# Patient Record
Sex: MALE | Race: WHITE | NOT HISPANIC OR LATINO | URBAN - METROPOLITAN AREA
[De-identification: names, ages, dates, MRNs, and addresses within clinical notes are randomized per-mention and may not be internally consistent; named-entity substitution may affect disease eponyms.]

---

## 2023-04-28 LAB
ALANINE AMINOTRANSFERASE (SGPT) (U/L) IN SER/PLAS: 19 U/L (ref 10–52)
ALBUMIN (G/DL) IN SER/PLAS: 4.3 G/DL (ref 3.4–5)
ALKALINE PHOSPHATASE (U/L) IN SER/PLAS: 66 U/L (ref 33–136)
ANION GAP IN SER/PLAS: 11 MMOL/L (ref 10–20)
APPEARANCE, URINE: CLEAR
ASPARTATE AMINOTRANSFERASE (SGOT) (U/L) IN SER/PLAS: 21 U/L (ref 9–39)
BASOPHILS (10*3/UL) IN BLOOD BY AUTOMATED COUNT: 0.02 X10E9/L (ref 0–0.1)
BASOPHILS/100 LEUKOCYTES IN BLOOD BY AUTOMATED COUNT: 0.6 % (ref 0–2)
BILIRUBIN TOTAL (MG/DL) IN SER/PLAS: 1.8 MG/DL (ref 0–1.2)
BILIRUBIN, URINE: NEGATIVE
BLOOD, URINE: NEGATIVE
CALCIUM (MG/DL) IN SER/PLAS: 9.1 MG/DL (ref 8.6–10.6)
CARBON DIOXIDE, TOTAL (MMOL/L) IN SER/PLAS: 28 MMOL/L (ref 21–32)
CHLORIDE (MMOL/L) IN SER/PLAS: 102 MMOL/L (ref 98–107)
CHOLESTEROL (MG/DL) IN SER/PLAS: 171 MG/DL (ref 0–199)
CHOLESTEROL IN HDL (MG/DL) IN SER/PLAS: 54.3 MG/DL
CHOLESTEROL/HDL RATIO: 3.1
COLOR, URINE: YELLOW
CREATININE (MG/DL) IN SER/PLAS: 1.09 MG/DL (ref 0.5–1.3)
EOSINOPHILS (10*3/UL) IN BLOOD BY AUTOMATED COUNT: 0.07 X10E9/L (ref 0–0.4)
EOSINOPHILS/100 LEUKOCYTES IN BLOOD BY AUTOMATED COUNT: 2 % (ref 0–6)
ERYTHROCYTE DISTRIBUTION WIDTH (RATIO) BY AUTOMATED COUNT: 11.7 % (ref 11.5–14.5)
ERYTHROCYTE MEAN CORPUSCULAR HEMOGLOBIN CONCENTRATION (G/DL) BY AUTOMATED: 33.7 G/DL (ref 32–36)
ERYTHROCYTE MEAN CORPUSCULAR VOLUME (FL) BY AUTOMATED COUNT: 96 FL (ref 80–100)
ERYTHROCYTES (10*6/UL) IN BLOOD BY AUTOMATED COUNT: 4.68 X10E12/L (ref 4.5–5.9)
GFR MALE: 71 ML/MIN/1.73M2
GLUCOSE (MG/DL) IN SER/PLAS: 83 MG/DL (ref 74–99)
GLUCOSE, URINE: NEGATIVE MG/DL
HEMATOCRIT (%) IN BLOOD BY AUTOMATED COUNT: 45.1 % (ref 41–52)
HEMOGLOBIN (G/DL) IN BLOOD: 15.2 G/DL (ref 13.5–17.5)
IMMATURE GRANULOCYTES/100 LEUKOCYTES IN BLOOD BY AUTOMATED COUNT: 0.3 % (ref 0–0.9)
KETONES, URINE: NEGATIVE MG/DL
LDL: 100 MG/DL (ref 0–99)
LEUKOCYTE ESTERASE, URINE: NEGATIVE
LEUKOCYTES (10*3/UL) IN BLOOD BY AUTOMATED COUNT: 3.6 X10E9/L (ref 4.4–11.3)
LYMPHOCYTES (10*3/UL) IN BLOOD BY AUTOMATED COUNT: 1.09 X10E9/L (ref 0.8–3)
LYMPHOCYTES/100 LEUKOCYTES IN BLOOD BY AUTOMATED COUNT: 30.6 % (ref 13–44)
MONOCYTES (10*3/UL) IN BLOOD BY AUTOMATED COUNT: 0.46 X10E9/L (ref 0.05–0.8)
MONOCYTES/100 LEUKOCYTES IN BLOOD BY AUTOMATED COUNT: 12.9 % (ref 2–10)
NEUTROPHILS (10*3/UL) IN BLOOD BY AUTOMATED COUNT: 1.91 X10E9/L (ref 1.6–5.5)
NEUTROPHILS/100 LEUKOCYTES IN BLOOD BY AUTOMATED COUNT: 53.6 % (ref 40–80)
NITRITE, URINE: NEGATIVE
NRBC (PER 100 WBCS) BY AUTOMATED COUNT: 0 /100 WBC (ref 0–0)
PH, URINE: 8 (ref 5–8)
PLATELETS (10*3/UL) IN BLOOD AUTOMATED COUNT: 230 X10E9/L (ref 150–450)
POTASSIUM (MMOL/L) IN SER/PLAS: 4 MMOL/L (ref 3.5–5.3)
PROSTATE SPECIFIC ANTIGEN,SCREEN: 0.76 NG/ML (ref 0–4)
PROTEIN TOTAL: 7.5 G/DL (ref 6.4–8.2)
PROTEIN, URINE: NEGATIVE MG/DL
SODIUM (MMOL/L) IN SER/PLAS: 137 MMOL/L (ref 136–145)
SPECIFIC GRAVITY, URINE: 1.01 (ref 1–1.03)
THYROTROPIN (MIU/L) IN SER/PLAS BY DETECTION LIMIT <= 0.05 MIU/L: 5.07 MIU/L (ref 0.44–3.98)
TRIGLYCERIDE (MG/DL) IN SER/PLAS: 83 MG/DL (ref 0–149)
URATE (MG/DL) IN SER/PLAS: 6.5 MG/DL (ref 4–7.5)
UREA NITROGEN (MG/DL) IN SER/PLAS: 16 MG/DL (ref 6–23)
UROBILINOGEN, URINE: <2 MG/DL (ref 0–1.9)
VLDL: 17 MG/DL (ref 0–40)

## 2023-05-02 LAB — ACETYLCHOL MODUL AB: 0 %

## 2023-10-13 ENCOUNTER — OFFICE VISIT (OUTPATIENT)
Dept: PRIMARY CARE | Facility: CLINIC | Age: 73
End: 2023-10-13
Payer: MEDICARE

## 2023-10-13 ENCOUNTER — LAB (OUTPATIENT)
Dept: LAB | Facility: LAB | Age: 73
End: 2023-10-13
Payer: MEDICARE

## 2023-10-13 VITALS
HEART RATE: 74 BPM | RESPIRATION RATE: 16 BRPM | SYSTOLIC BLOOD PRESSURE: 100 MMHG | OXYGEN SATURATION: 96 % | DIASTOLIC BLOOD PRESSURE: 70 MMHG | TEMPERATURE: 97.8 F | BODY MASS INDEX: 22.5 KG/M2 | WEIGHT: 180 LBS

## 2023-10-13 DIAGNOSIS — J06.9 VIRAL UPPER RESPIRATORY TRACT INFECTION: ICD-10-CM

## 2023-10-13 DIAGNOSIS — R53.83 OTHER FATIGUE: ICD-10-CM

## 2023-10-13 DIAGNOSIS — I48.92 ATRIAL FLUTTER, UNSPECIFIED TYPE (MULTI): ICD-10-CM

## 2023-10-13 DIAGNOSIS — R00.0 INCREASED HEART RATE: ICD-10-CM

## 2023-10-13 DIAGNOSIS — R09.89 SYMPTOMS OF UPPER RESPIRATORY INFECTION (URI): ICD-10-CM

## 2023-10-13 DIAGNOSIS — I48.92 ATRIAL FLUTTER, UNSPECIFIED TYPE (MULTI): Primary | ICD-10-CM

## 2023-10-13 PROBLEM — M54.2 NECK PAIN: Status: ACTIVE | Noted: 2023-10-13

## 2023-10-13 LAB
ALBUMIN SERPL BCP-MCNC: 4.3 G/DL (ref 3.4–5)
ALP SERPL-CCNC: 67 U/L (ref 33–136)
ALT SERPL W P-5'-P-CCNC: 22 U/L (ref 10–52)
ANION GAP SERPL CALC-SCNC: 13 MMOL/L (ref 10–20)
AST SERPL W P-5'-P-CCNC: 19 U/L (ref 9–39)
BILIRUB SERPL-MCNC: 1.9 MG/DL (ref 0–1.2)
BUN SERPL-MCNC: 23 MG/DL (ref 6–23)
CALCIUM SERPL-MCNC: 9.7 MG/DL (ref 8.6–10.3)
CHLORIDE SERPL-SCNC: 101 MMOL/L (ref 98–107)
CO2 SERPL-SCNC: 29 MMOL/L (ref 21–32)
CREAT SERPL-MCNC: 1.3 MG/DL (ref 0.5–1.3)
ERYTHROCYTE [DISTWIDTH] IN BLOOD BY AUTOMATED COUNT: 11.9 % (ref 11.5–14.5)
GFR SERPL CREATININE-BSD FRML MDRD: 58 ML/MIN/1.73M*2
GLUCOSE SERPL-MCNC: 90 MG/DL (ref 74–99)
HCT VFR BLD AUTO: 46.3 % (ref 41–52)
HGB BLD-MCNC: 15.8 G/DL (ref 13.5–17.5)
MCH RBC QN AUTO: 32.8 PG (ref 26–34)
MCHC RBC AUTO-ENTMCNC: 34.1 G/DL (ref 32–36)
MCV RBC AUTO: 96 FL (ref 80–100)
NRBC BLD-RTO: 0 /100 WBCS (ref 0–0)
PLATELET # BLD AUTO: 226 X10*3/UL (ref 150–450)
PMV BLD AUTO: 9.7 FL (ref 7.5–11.5)
POTASSIUM SERPL-SCNC: 5.5 MMOL/L (ref 3.5–5.3)
PROT SERPL-MCNC: 7.3 G/DL (ref 6.4–8.2)
RBC # BLD AUTO: 4.82 X10*6/UL (ref 4.5–5.9)
SODIUM SERPL-SCNC: 137 MMOL/L (ref 136–145)
TSH SERPL-ACNC: 3.21 MIU/L (ref 0.44–3.98)
WBC # BLD AUTO: 4.3 X10*3/UL (ref 4.4–11.3)

## 2023-10-13 PROCEDURE — 85027 COMPLETE CBC AUTOMATED: CPT

## 2023-10-13 PROCEDURE — 36415 COLL VENOUS BLD VENIPUNCTURE: CPT

## 2023-10-13 PROCEDURE — 93000 ELECTROCARDIOGRAM COMPLETE: CPT | Performed by: FAMILY MEDICINE

## 2023-10-13 PROCEDURE — 1160F RVW MEDS BY RX/DR IN RCRD: CPT | Performed by: FAMILY MEDICINE

## 2023-10-13 PROCEDURE — 1036F TOBACCO NON-USER: CPT | Performed by: FAMILY MEDICINE

## 2023-10-13 PROCEDURE — 1159F MED LIST DOCD IN RCRD: CPT | Performed by: FAMILY MEDICINE

## 2023-10-13 PROCEDURE — 84443 ASSAY THYROID STIM HORMONE: CPT

## 2023-10-13 PROCEDURE — 80053 COMPREHEN METABOLIC PANEL: CPT

## 2023-10-13 PROCEDURE — 87635 SARS-COV-2 COVID-19 AMP PRB: CPT

## 2023-10-13 PROCEDURE — 99214 OFFICE O/P EST MOD 30 MIN: CPT | Performed by: FAMILY MEDICINE

## 2023-10-13 RX ORDER — TAMSULOSIN HYDROCHLORIDE 0.4 MG/1
0.4 CAPSULE ORAL DAILY
COMMUNITY
Start: 2023-01-05

## 2023-10-13 RX ORDER — METOPROLOL SUCCINATE 25 MG/1
25 TABLET, EXTENDED RELEASE ORAL DAILY
Qty: 30 TABLET | Refills: 5 | Status: SHIPPED | OUTPATIENT
Start: 2023-10-13 | End: 2023-11-06

## 2023-10-13 RX ORDER — TADALAFIL 5 MG/1
5 TABLET ORAL
COMMUNITY
Start: 2019-01-16 | End: 2023-11-07 | Stop reason: SDUPTHER

## 2023-10-13 RX ORDER — NIFEDIPINE 30 MG/1
30 TABLET, EXTENDED RELEASE ORAL
COMMUNITY
Start: 2022-02-21 | End: 2023-10-13 | Stop reason: ALTCHOICE

## 2023-10-13 RX ORDER — ATORVASTATIN CALCIUM 10 MG/1
1 TABLET, FILM COATED ORAL
COMMUNITY
Start: 2022-02-21 | End: 2023-11-07 | Stop reason: SDUPTHER

## 2023-10-13 NOTE — PROGRESS NOTES
Subjective   Patient ID: Christophe Cedillo is a 72 y.o. male who presents for Fatigue (Fatigued, elevated heart rate, slight cough/Sxs for about a week ).  HPI  Patient is feeling fatigued with a little congestion   Not coughing all the time   Deeper chest cough   This morning some phlegm   The biggest thing his pulse is elevated.   Usually his resting pulse rate is 45-50   No in the 70 to 80   And he works out a lot   Runs trails   Spends a lot of time working   Heart rate zones .   Has not been able to do much   If he walks up the hill he get tired.   COVID-19 testing negative.   Not taking anythign over the counter   No recent started allergy pills   Using     Patient has been sleeping well   Drinks 100 to 120 ounces a day   No pain in the calf muslce     O2 sat with a mask was 97%   Heart rate 75 , regular   EKG showed.d   Review of Systems    Past Medical History:   Diagnosis Date    Disorder of lipoprotein metabolism, unspecified     Borderline high cholesterol    Headache, unspecified     Chronic headaches    Personal history of other diseases of the musculoskeletal system and connective tissue     History of gout       Past Surgical History:   Procedure Laterality Date    KIDNEY STONE SURGERY      OTHER SURGICAL HISTORY  07/14/2022    No history of surgery      Social History     Socioeconomic History    Marital status:      Spouse name: None    Number of children: None    Years of education: None    Highest education level: None   Occupational History    None   Tobacco Use    Smoking status: Never    Smokeless tobacco: Never   Substance and Sexual Activity    Alcohol use: None    Drug use: None    Sexual activity: None   Other Topics Concern    None   Social History Narrative    None     Social Determinants of Health     Financial Resource Strain: Not on file   Food Insecurity: Not on file   Transportation Needs: Not on file   Physical Activity: Not on file   Stress: Not on file   Social Connections: Not  on file   Intimate Partner Violence: Not on file   Housing Stability: Not on file      Family History   Problem Relation Name Age of Onset    Heart failure Mother      Other (old age) Father         MEDICATIONS AND ALLERGIES:    ALLERGIES Patient has no known allergies.    MEDICATIONS   Current Outpatient Medications on File Prior to Visit   Medication Sig Dispense Refill    atorvastatin (Lipitor) 10 mg tablet Take 1 tablet (10 mg) by mouth once daily in the morning. Take before meals.      NIFEdipine XL 30 mg 24 hr tablet Take 1 tablet (30 mg) by mouth once daily in the morning. Take before meals.      tadalafil (Cialis) 5 mg tablet Take 1 tablet (5 mg) by mouth once daily.      tamsulosin (Flomax) 0.4 mg 24 hr capsule Take 1 capsule (0.4 mg) by mouth once daily.       No current facility-administered medications on file prior to visit.        Objective   Visit Vitals  /70   Pulse 74   Temp 36.6 °C (97.8 °F)   Resp 16   Wt 81.6 kg (180 lb)   SpO2 96%   BMI 22.50 kg/m²   Smoking Status Never   BSA 2.08 m²      Physical Exam  Constitutional:       Appearance: Normal appearance.   HENT:      Head: Normocephalic.      Nose: No congestion or rhinorrhea.   Cardiovascular:      Rate and Rhythm: Normal rate and regular rhythm.      Heart sounds: No murmur heard.     No friction rub. No gallop.   Pulmonary:      Effort: Pulmonary effort is normal.      Breath sounds: Normal breath sounds. No stridor. No rhonchi or rales.   Chest:      Chest wall: No tenderness.   Abdominal:      General: Abdomen is flat. There is no distension.   Neurological:      Mental Status: He is alert.       1. Atrial flutter, unspecified type (CMS/HCC)  EKG showing Atrial flutter   CHADs 2 vasc score of 1   After discussing treatmetn options , patient concented on starting eliquis   Will start treatmet with eliquis , smaple provided   Will refer to cardiology   Will check the labs bleow   Alarmign signs dicussed , he is to go to the ED if he  had any   Chest pain , SOB or any new symptoms   Will refer to cardiology , case discussed with Dr. Anderson , he will see him next week.   - apixaban (Eliquis) 5 mg tablet; Take 1 tablet (5 mg) by mouth 2 times a day.  Dispense: 60 tablet; Refill: 1  - metoprolol succinate XL (Toprol-XL) 25 mg 24 hr tablet; Take 1 tablet (25 mg) by mouth once daily. Do not crush or chew.  Dispense: 30 tablet; Refill: 5  - Referral to Cardiology; Future  - CBC; Future  - Comprehensive Metabolic Panel; Future  - TSH with reflex to Free T4 if abnormal; Future    2. Viral upper respiratory tract infection  Home test negative per patient.   - Sars-CoV-2 PCR, Symptomatic  - apixaban (Eliquis) 5 mg tablet; Take 1 tablet (5 mg) by mouth 2 times a day.  Dispense: 60 tablet; Refill: 1  - metoprolol succinate XL (Toprol-XL) 25 mg 24 hr tablet; Take 1 tablet (25 mg) by mouth once daily. Do not crush or chew.  Dispense: 30 tablet; Refill: 5  - Referral to Cardiology; Future  - CBC; Future  - Comprehensive Metabolic Panel; Future  - TSH with reflex to Free T4 if abnormal; Future    3. Increased heart rate  EKG showin gflutter.   Will start eliquis   Metoprolol   Refer to cardiology   Alarming signs dicussed, advised to go to the ED if he experience any chest pain , SOB or any other new symptoms.   - apixaban (Eliquis) 5 mg tablet; Take 1 tablet (5 mg) by mouth 2 times a day.  Dispense: 60 tablet; Refill: 1  - metoprolol succinate XL (Toprol-XL) 25 mg 24 hr tablet; Take 1 tablet (25 mg) by mouth once daily. Do not crush or chew.  Dispense: 30 tablet; Refill: 5  - Referral to Cardiology; Future  - CBC; Future  - Comprehensive Metabolic Panel; Future  - TSH with reflex to Free T4 if abnormal; Future    4. Other fatigue  Could be due to flutter   Plan as shown below   Patient not having any chest pain   - apixaban (Eliquis) 5 mg tablet; Take 1 tablet (5 mg) by mouth 2 times a day.  Dispense: 60 tablet; Refill: 1  - metoprolol succinate XL (Toprol-XL)  25 mg 24 hr tablet; Take 1 tablet (25 mg) by mouth once daily. Do not crush or chew.  Dispense: 30 tablet; Refill: 5  - Referral to Cardiology; Future  - CBC; Future  - Comprehensive Metabolic Panel; Future  - TSH with reflex to Free T4 if abnormal; Future    5. Symptoms of upper respiratory infection (URI)  Getting better.   - Sars-CoV-2 PCR, Symptomatic  - apixaban (Eliquis) 5 mg tablet; Take 1 tablet (5 mg) by mouth 2 times a day.  Dispense: 60 tablet; Refill: 1  - metoprolol succinate XL (Toprol-XL) 25 mg 24 hr tablet; Take 1 tablet (25 mg) by mouth once daily. Do not crush or chew.  Dispense: 30 tablet; Refill: 5  - Referral to Cardiology; Future  - CBC; Future  - Comprehensive Metabolic Panel; Future  - TSH with reflex to Free T4 if abnormal; Future

## 2023-10-14 LAB — SARS-COV-2 RNA RESP QL NAA+PROBE: NOT DETECTED

## 2023-10-16 ENCOUNTER — HOSPITAL ENCOUNTER (OUTPATIENT)
Dept: CARDIOLOGY | Facility: CLINIC | Age: 73
Discharge: HOME | End: 2023-10-16
Payer: MEDICARE

## 2023-10-16 ENCOUNTER — TELEPHONE (OUTPATIENT)
Dept: CARDIOLOGY | Facility: CLINIC | Age: 73
End: 2023-10-16

## 2023-10-16 ENCOUNTER — OFFICE VISIT (OUTPATIENT)
Dept: CARDIOLOGY | Facility: CLINIC | Age: 73
End: 2023-10-16
Payer: MEDICARE

## 2023-10-16 VITALS
HEIGHT: 75 IN | TEMPERATURE: 98.2 F | HEART RATE: 77 BPM | SYSTOLIC BLOOD PRESSURE: 148 MMHG | DIASTOLIC BLOOD PRESSURE: 90 MMHG | WEIGHT: 181.6 LBS | BODY MASS INDEX: 22.58 KG/M2

## 2023-10-16 DIAGNOSIS — E78.2 MIXED HYPERLIPIDEMIA: ICD-10-CM

## 2023-10-16 DIAGNOSIS — I48.3 TYPICAL ATRIAL FLUTTER (MULTI): Primary | ICD-10-CM

## 2023-10-16 DIAGNOSIS — I48.3 TYPICAL ATRIAL FLUTTER (MULTI): ICD-10-CM

## 2023-10-16 PROCEDURE — 1160F RVW MEDS BY RX/DR IN RCRD: CPT | Performed by: INTERNAL MEDICINE

## 2023-10-16 PROCEDURE — 93010 ELECTROCARDIOGRAM REPORT: CPT | Performed by: INTERNAL MEDICINE

## 2023-10-16 PROCEDURE — 99204 OFFICE O/P NEW MOD 45 MIN: CPT | Performed by: INTERNAL MEDICINE

## 2023-10-16 PROCEDURE — 93242 EXT ECG>48HR<7D RECORDING: CPT

## 2023-10-16 PROCEDURE — 93005 ELECTROCARDIOGRAM TRACING: CPT

## 2023-10-16 PROCEDURE — 1159F MED LIST DOCD IN RCRD: CPT | Performed by: INTERNAL MEDICINE

## 2023-10-16 PROCEDURE — 99214 OFFICE O/P EST MOD 30 MIN: CPT | Mod: 25 | Performed by: INTERNAL MEDICINE

## 2023-10-16 PROCEDURE — 1036F TOBACCO NON-USER: CPT | Performed by: INTERNAL MEDICINE

## 2023-10-16 NOTE — PROGRESS NOTES
"Chief Complaint:   No chief complaint on file.     History Of Present Illness:    Christophe Cedillo is a 72 y.o. male presenting with for new evaluation of paroxysmal atrial flutter.  The patient initially presented with symptoms of fatigue and his watch detected HR 90 (normal 40s).  Exercises regularly. The patient has been maintaining sinus rhythm on medical treatment which they are tolerating well and are compliant.  The current treatment strategy is rhythm control.  The CHADS2-VASC2 score is 1  and the ACC/AHA guidelines recommend anticoagulation for stroke prophylaxis.  The patient is being treated with and has tolerated treatment with no bleeding and is compliant.  No cardiac Hx.  Brother with CAD. No AF.  No CP or HICKEY. No ETOH.    Review of Systems  All pertinent systems have been reviewed and are negative except for what is stated in the history of present illness.    All other systems have been reviewed and are negative and noncontributory to this patient's current ailments.  .     Last Recorded Vitals:  Visit Vitals  /90   Pulse 77   Temp 36.8 °C (98.2 °F)   Ht 1.905 m (6' 3\")   Wt 82.4 kg (181 lb 9.6 oz)   BMI 22.70 kg/m²   Smoking Status Never   BSA 2.09 m²        Past Medical History:  He has a past medical history of Disorder of lipoprotein metabolism, unspecified, Headache, unspecified, Personal history of other diseases of the musculoskeletal system and connective tissue, and Typical atrial flutter (CMS/HCC) (10/16/2023).    Past Surgical History:  He has a past surgical history that includes Other surgical history (07/14/2022) and Kidney stone surgery.      Social History:  He reports that he has never smoked. He has never used smokeless tobacco. No history on file for alcohol use and drug use.    Family History:  Family History   Problem Relation Name Age of Onset    Heart failure Mother      Other (old age) Father          Allergies:  Patient has no known allergies.    Outpatient " Medications:  Current Outpatient Medications   Medication Instructions    apixaban (ELIQUIS) 5 mg, oral, 2 times daily    atorvastatin (Lipitor) 10 mg tablet 1 tablet, oral, Daily before breakfast    metoprolol succinate XL (TOPROL-XL) 25 mg, oral, Daily, Do not crush or chew.    tadalafil (CIALIS) 5 mg, oral, Daily RT    tamsulosin (FLOMAX) 0.4 mg, oral, Daily       Physical Exam:  Physical Exam  Vitals reviewed.   Constitutional:       General: He is not in acute distress.     Appearance: Normal appearance.   HENT:      Head: Normocephalic and atraumatic.      Nose: Nose normal.   Eyes:      Conjunctiva/sclera: Conjunctivae normal.   Cardiovascular:      Rate and Rhythm: Normal rate and regular rhythm.      Pulses: Normal pulses.      Heart sounds: No murmur heard.  Pulmonary:      Effort: Pulmonary effort is normal. No respiratory distress.      Breath sounds: Normal breath sounds. No wheezing, rhonchi or rales.   Abdominal:      General: Bowel sounds are normal. There is no distension.      Palpations: Abdomen is soft.      Tenderness: There is no abdominal tenderness.   Musculoskeletal:         General: No swelling.      Right lower leg: No edema.      Left lower leg: No edema.   Skin:     General: Skin is warm and dry.      Capillary Refill: Capillary refill takes less than 2 seconds.   Neurological:      General: No focal deficit present.      Mental Status: He is alert.   Psychiatric:         Mood and Affect: Mood normal.          Last Labs:  CBC -  Lab Results   Component Value Date    WBC 4.3 (L) 10/13/2023    HGB 15.8 10/13/2023    HCT 46.3 10/13/2023    MCV 96 10/13/2023     10/13/2023       CMP -  Lab Results   Component Value Date    CALCIUM 9.7 10/13/2023    PROT 7.3 10/13/2023    ALBUMIN 4.3 10/13/2023    AST 19 10/13/2023    ALT 22 10/13/2023    ALKPHOS 67 10/13/2023    BILITOT 1.9 (H) 10/13/2023       LIPID PANEL -   Lab Results   Component Value Date    CHOL 171 04/28/2023    HDL 54.3  "04/28/2023    CHHDL 3.1 04/28/2023    VLDL 17 04/28/2023    TRIG 83 04/28/2023       RENAL FUNCTION PANEL -   Lab Results   Component Value Date    K 5.5 (H) 10/13/2023       No results found for: \"BNP\", \"HGBA1C\"    Last Cardiology Tests:  ECG:  ECG 12 Lead 10/16/2023 Typical atrial flutter      Echo:  No echocardiogram results found for the past 12 months           Assessment/Plan     1. Typical atrial flutter (CMS/HCC)  Mild symptoms of new onset typical atrial flutter (fatigue).  May resume exercise but lower level.  He will watch HR.  Continue Eliquis for now. Persistent flutter rate controlled. DCCV in 4 weeks.  Echo and Holter.    2. Mixed hyperlipidemia  Hyperlipidemia: The patient's lipids are well controlled on chronic statin therapy and they are meeting their goal LDL cholesterol per the ACC/AHA guidelines.  The patient is compliant and tolerating statin therapy and is following a heart health diet and performs regular exercise.  The benefits of lipid lowering therapy have been discussed with the patient/family/caregiver.          Art Anderson MD    Exclusive of any other services or procedures performed, I, Art Anderson MD , spent 30 minutes in duration for this visit today.  This time consisted of chart review, obtaining history, and/or performing the exam as documented above as well as documenting the clinical information for the encounter in the electronic record, discussing treatment options, plans, and/or goals with patient, family, and/or caregiver, refilling medications, updating the electronic record, ordering medicines, lab work, imaging, referrals, and/or procedures as documented above and communicating with other [facility] professionals. I have discussed the results of laboratory, radiology, and cardiology studies with the patient and their family/caregiver.    "

## 2023-10-19 ENCOUNTER — TELEPHONE (OUTPATIENT)
Dept: PRIMARY CARE | Facility: CLINIC | Age: 73
End: 2023-10-19
Payer: MEDICARE

## 2023-10-19 DIAGNOSIS — E87.5 HYPERKALEMIA: Primary | ICD-10-CM

## 2023-10-19 NOTE — TELEPHONE ENCOUNTER
----- Message from Luther Pantoja MD sent at 10/19/2023  8:28 AM EDT -----  recommend that they stay hydrated by drinking water and avoid or cut down on food high in potassium like Bananas, oranges, cantaloupe, honeydew, apricots, grapefruit (some dried fruits, such as prunes, raisins, and dates, are also high in potassium)  Cooked spinach.  Cooked broccoli.  Potatoes.  Sweet potatoes.  Mushrooms.  Peas.  Cucumbers. bannans      Repeat testing in 1-2 weeks     Otherwise labs are normal

## 2023-10-26 LAB
ATRIAL RATE: 315 BPM
P AXIS: 70 DEGREES
P OFFSET: 183 MS
P ONSET: 80 MS
Q ONSET: 225 MS
QRS COUNT: 11 BEATS
QRS DURATION: 94 MS
QT INTERVAL: 404 MS
QTC CALCULATION(BAZETT): 429 MS
QTC FREDERICIA: 421 MS
R AXIS: 50 DEGREES
T AXIS: 41 DEGREES
T OFFSET: 427 MS
VENTRICULAR RATE: 68 BPM

## 2023-10-30 ENCOUNTER — TELEPHONE (OUTPATIENT)
Dept: CARDIOLOGY | Facility: CLINIC | Age: 73
End: 2023-10-30
Payer: MEDICARE

## 2023-10-30 PROCEDURE — 93244 EXT ECG>48HR<7D REV&INTERPJ: CPT | Performed by: INTERNAL MEDICINE

## 2023-11-01 DIAGNOSIS — I48.3 TYPICAL ATRIAL FLUTTER (MULTI): Primary | ICD-10-CM

## 2023-11-04 DIAGNOSIS — R09.89 SYMPTOMS OF UPPER RESPIRATORY INFECTION (URI): ICD-10-CM

## 2023-11-04 DIAGNOSIS — R00.0 INCREASED HEART RATE: ICD-10-CM

## 2023-11-04 DIAGNOSIS — I48.92 ATRIAL FLUTTER, UNSPECIFIED TYPE (MULTI): ICD-10-CM

## 2023-11-04 DIAGNOSIS — J06.9 VIRAL UPPER RESPIRATORY TRACT INFECTION: ICD-10-CM

## 2023-11-04 DIAGNOSIS — R53.83 OTHER FATIGUE: ICD-10-CM

## 2023-11-06 ENCOUNTER — APPOINTMENT (OUTPATIENT)
Dept: CARDIOLOGY | Facility: CLINIC | Age: 73
End: 2023-11-06
Payer: MEDICARE

## 2023-11-06 RX ORDER — METOPROLOL SUCCINATE 25 MG/1
25 TABLET, EXTENDED RELEASE ORAL DAILY
Qty: 90 TABLET | Refills: 2 | Status: SHIPPED | OUTPATIENT
Start: 2023-11-06 | End: 2023-11-28 | Stop reason: ALTCHOICE

## 2023-11-07 DIAGNOSIS — E78.2 MIXED HYPERLIPIDEMIA: ICD-10-CM

## 2023-11-07 DIAGNOSIS — N52.9 VASCULOGENIC ERECTILE DYSFUNCTION, UNSPECIFIED VASCULOGENIC ERECTILE DYSFUNCTION TYPE: ICD-10-CM

## 2023-11-07 DIAGNOSIS — E78.2 MIXED HYPERLIPIDEMIA: Primary | ICD-10-CM

## 2023-11-07 DIAGNOSIS — N40.1 BENIGN PROSTATIC HYPERPLASIA WITH URINARY HESITANCY: ICD-10-CM

## 2023-11-07 DIAGNOSIS — R39.11 BENIGN PROSTATIC HYPERPLASIA WITH URINARY HESITANCY: ICD-10-CM

## 2023-11-07 LAB — BODY SURFACE AREA: 2.09 M2

## 2023-11-07 RX ORDER — TADALAFIL 5 MG/1
5 TABLET ORAL
Qty: 90 TABLET | Refills: 1 | Status: SHIPPED | OUTPATIENT
Start: 2023-11-07 | End: 2023-11-07

## 2023-11-07 RX ORDER — TADALAFIL 5 MG/1
5 TABLET ORAL DAILY
Qty: 90 TABLET | Refills: 1 | Status: SHIPPED | OUTPATIENT
Start: 2023-11-07

## 2023-11-07 RX ORDER — ATORVASTATIN CALCIUM 10 MG/1
10 TABLET, FILM COATED ORAL
Qty: 90 TABLET | Refills: 1 | Status: SHIPPED | OUTPATIENT
Start: 2023-11-07 | End: 2024-05-06

## 2023-11-07 NOTE — TELEPHONE ENCOUNTER
Patient is needing     Atorvastatin 10 mg [facility]     Tadalafil 5 mg [facility]

## 2023-11-08 ENCOUNTER — LAB (OUTPATIENT)
Dept: LAB | Facility: LAB | Age: 73
End: 2023-11-08
Payer: MEDICARE

## 2023-11-08 ENCOUNTER — HOSPITAL ENCOUNTER (OUTPATIENT)
Dept: CARDIOLOGY | Facility: HOSPITAL | Age: 73
Discharge: HOME | End: 2023-11-08
Payer: MEDICARE

## 2023-11-08 DIAGNOSIS — E87.5 HYPERKALEMIA: ICD-10-CM

## 2023-11-08 DIAGNOSIS — I48.3 TYPICAL ATRIAL FLUTTER (MULTI): ICD-10-CM

## 2023-11-08 LAB
ANION GAP SERPL CALC-SCNC: 11 MMOL/L (ref 10–20)
BUN SERPL-MCNC: 21 MG/DL (ref 6–23)
CALCIUM SERPL-MCNC: 9.4 MG/DL (ref 8.6–10.3)
CHLORIDE SERPL-SCNC: 100 MMOL/L (ref 98–107)
CO2 SERPL-SCNC: 29 MMOL/L (ref 21–32)
CREAT SERPL-MCNC: 1.12 MG/DL (ref 0.5–1.3)
GFR SERPL CREATININE-BSD FRML MDRD: 69 ML/MIN/1.73M*2
GLUCOSE SERPL-MCNC: 83 MG/DL (ref 74–99)
POTASSIUM SERPL-SCNC: 4.2 MMOL/L (ref 3.5–5.3)
SODIUM SERPL-SCNC: 136 MMOL/L (ref 136–145)

## 2023-11-08 PROCEDURE — 36415 COLL VENOUS BLD VENIPUNCTURE: CPT

## 2023-11-08 PROCEDURE — 80048 BASIC METABOLIC PNL TOTAL CA: CPT

## 2023-11-10 ENCOUNTER — HOSPITAL ENCOUNTER (OUTPATIENT)
Dept: CARDIOLOGY | Facility: HOSPITAL | Age: 73
Discharge: HOME | End: 2023-11-10
Attending: INTERNAL MEDICINE | Admitting: INTERNAL MEDICINE
Payer: MEDICARE

## 2023-11-10 DIAGNOSIS — I48.3 TYPICAL ATRIAL FLUTTER (MULTI): ICD-10-CM

## 2023-11-10 PROCEDURE — 92960 CARDIOVERSION ELECTRIC EXT: CPT

## 2023-11-10 PROCEDURE — 93010 ELECTROCARDIOGRAM REPORT: CPT | Performed by: INTERNAL MEDICINE

## 2023-11-10 PROCEDURE — 92960 CARDIOVERSION ELECTRIC EXT: CPT | Performed by: INTERNAL MEDICINE

## 2023-11-10 RX ORDER — SODIUM CHLORIDE 9 MG/ML
75 INJECTION, SOLUTION INTRAVENOUS CONTINUOUS
Status: DISCONTINUED | OUTPATIENT
Start: 2023-11-10 | End: 2023-11-10

## 2023-11-10 NOTE — POST-PROCEDURE NOTE
Physician Transition of Care Summary  Invasive Cardiovascular Lab    Procedure Date: 11/10/2023  Attending: * Surgery not found *  Resident/Fellow/Other Assistant: * Surgery not found *    Indications:   * No surgery found *    Post-procedure diagnosis:   * No surgery found *    Procedure(s):   * Cannot find OR case *  * No surgery found *    Procedure Findings:   In NSR this am-converted at home    Description of the Procedure:   Not done    Complications:   N/A    Stents/Implants:       Anticoagulation/Antiplatelet Plan:   Continue Eliquis    Estimated Blood Loss:   * No surgery found *    Anesthesia: * No surgery found * Anesthesia Staff: Anesthesia staff cannot be found from this context.    Any Specimen(s) Removed:   * Cannot find OR log *    Disposition:   Home      Electronically signed by: Art Anderson MD, 11/10/2023 7:27 AM

## 2023-11-13 ENCOUNTER — TELEPHONE (OUTPATIENT)
Dept: CARDIOLOGY | Facility: CLINIC | Age: 73
End: 2023-11-13
Payer: MEDICARE

## 2023-11-13 DIAGNOSIS — R53.83 OTHER FATIGUE: ICD-10-CM

## 2023-11-13 DIAGNOSIS — I48.92 ATRIAL FLUTTER, UNSPECIFIED TYPE (MULTI): ICD-10-CM

## 2023-11-13 DIAGNOSIS — J06.9 VIRAL UPPER RESPIRATORY TRACT INFECTION: ICD-10-CM

## 2023-11-13 DIAGNOSIS — R09.89 SYMPTOMS OF UPPER RESPIRATORY INFECTION (URI): ICD-10-CM

## 2023-11-13 DIAGNOSIS — R00.0 INCREASED HEART RATE: ICD-10-CM

## 2023-11-16 ENCOUNTER — OFFICE VISIT (OUTPATIENT)
Dept: ORTHOPEDIC SURGERY | Facility: CLINIC | Age: 73
End: 2023-11-16
Payer: MEDICARE

## 2023-11-16 VITALS — BODY MASS INDEX: 22.38 KG/M2 | WEIGHT: 180 LBS | HEIGHT: 75 IN

## 2023-11-16 DIAGNOSIS — M10.9 ACUTE GOUT OF LEFT ANKLE, UNSPECIFIED CAUSE: ICD-10-CM

## 2023-11-16 DIAGNOSIS — M25.572 ACUTE LEFT ANKLE PAIN: ICD-10-CM

## 2023-11-16 PROCEDURE — 1160F RVW MEDS BY RX/DR IN RCRD: CPT | Performed by: SPECIALIST

## 2023-11-16 PROCEDURE — 87205 SMEAR GRAM STAIN: CPT | Performed by: SPECIALIST

## 2023-11-16 PROCEDURE — 89060 EXAM SYNOVIAL FLUID CRYSTALS: CPT | Performed by: SPECIALIST

## 2023-11-16 PROCEDURE — 1036F TOBACCO NON-USER: CPT | Performed by: SPECIALIST

## 2023-11-16 PROCEDURE — 1159F MED LIST DOCD IN RCRD: CPT | Performed by: SPECIALIST

## 2023-11-16 PROCEDURE — 89051 BODY FLUID CELL COUNT: CPT | Performed by: SPECIALIST

## 2023-11-16 PROCEDURE — 99214 OFFICE O/P EST MOD 30 MIN: CPT | Performed by: SPECIALIST

## 2023-11-16 NOTE — PROGRESS NOTES
Left foot and ankle pain - no injury.   States that 3-4 weeks ago he had some pain, but then last week I got really bad.   His foot swelled up and was hurting so bad he went to the ER on 11/5/23.   Was given percocet and prednisone and a walking boot.   Has started therapy - and is feeling a little better than it was.     Brought XR disc with him.   No prior surgeries.   States he is a runner and has not been able to for 3 weeks.    Can not take NSAIDS due to having a-fib and being on Eliquis.  History of Present Illness: Patient states he believes he does have a history of gout.        On examination of the left ankle/foot:  Minimally antalgic gait  Neutral alignment  Moderate swelling; no ecchymosis or erythema. Skin intact; no ulcers or lesions.   Full with mild discomfort ROM in  ankle plantarflexion, dorsiflexion, inversion and eversion; normal ROM in 2-5th toe flexion/extension and 1st MTP flexion/extension  Normal strength in ankle plantarflexion, dorsiflexion, inversion and eversion; normal strength with great toe flexion/extension  Tenderness to palpation:  No tenderness to palpation over the Achilles, medial and lateral malleolus, posterior tibial tendon, peroneal tendon, ATFL, deltoid ligament, talus or navicular.  No tenderness to palpation over heel, plantar arch, base of 1st metatarsal/2nd metatarsal, sesamoids, 5th metatarsal, medial cuneiform, navicular, 1st MTP joint or 2nd or 3rd intermetarsal space.  Neurovascularly intact. Good capillary refill. No sensory deficit to light touch. Normal proprioception. Dorsalis pedis and posterior tibial pulses 2+ bilaterally.        I personally reviewed the patient's x-ray reports of the left ankle/foot. 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    PLAN: Treatment options were discussed with the patient.  He did proceed with an  aspiration/injection left ankle well-tolerated.  Fluid was sent for analysis.  We will call with results and final disposition.  L Inj/Asp (Left ankle) on 11/17/2023 12:59 PM  Indications: pain  Details: 22 G needle, anterolateral approach  Medications: 5 mL lidocaine 10 mg/mL (1 %); 40 mg triamcinolone acetonide 40 mg/mL  Aspirate: 3 mL yellow and cloudy; sent for lab analysis  Outcome: tolerated well, no immediate complications  Procedure, treatment alternatives, risks and benefits explained, specific risks discussed. Consent was given by the patient. Immediately prior to procedure a time out was called to verify the correct patient, procedure, equipment, support staff and site/side marked as required. Patient was prepped and draped in the usual sterile fashion.           This note was dictated using speech recognition software and was not corrected for spelling or grammatical errors

## 2023-11-17 ENCOUNTER — TELEPHONE (OUTPATIENT)
Dept: ORTHOPEDIC SURGERY | Facility: CLINIC | Age: 73
End: 2023-11-17

## 2023-11-17 LAB
BASOPHILS NFR FLD MANUAL: 0 %
BLASTS NFR FLD MANUAL: 0 %
CLARITY FLD: ABNORMAL
COLOR FLD: ABNORMAL
CRYSTALS FLD MICRO: NORMAL
EOSINOPHIL NFR FLD MANUAL: 0 %
IMMATURE GRANULOCYTES IN FLUID: 0 %
LYMPHOCYTES NFR FLD MANUAL: 4 %
MONOS+MACROS NFR FLD MANUAL: 52 %
NEUTROPHILS NFR FLD MANUAL: 44 %
OTHER CELLS NFR FLD MANUAL: 0 %
PLASMA CELLS NFR FLD MANUAL: 0 %
RBC # FLD AUTO: ABNORMAL /UL
TOTAL CELLS COUNTED SNV: 100
WBC # FLD AUTO: 1609 /UL

## 2023-11-17 PROCEDURE — 20610 DRAIN/INJ JOINT/BURSA W/O US: CPT | Performed by: SPECIALIST

## 2023-11-17 RX ORDER — TRIAMCINOLONE ACETONIDE 40 MG/ML
40 INJECTION, SUSPENSION INTRA-ARTICULAR; INTRAMUSCULAR
Status: COMPLETED | OUTPATIENT
Start: 2023-11-17 | End: 2023-11-17

## 2023-11-17 RX ORDER — LIDOCAINE HYDROCHLORIDE 10 MG/ML
5 INJECTION INFILTRATION; PERINEURAL
Status: COMPLETED | OUTPATIENT
Start: 2023-11-17 | End: 2023-11-17

## 2023-11-17 RX ADMIN — LIDOCAINE HYDROCHLORIDE 5 ML: 10 INJECTION INFILTRATION; PERINEURAL at 12:59

## 2023-11-17 RX ADMIN — TRIAMCINOLONE ACETONIDE 40 MG: 40 INJECTION, SUSPENSION INTRA-ARTICULAR; INTRAMUSCULAR at 12:59

## 2023-11-17 NOTE — TELEPHONE ENCOUNTER
Patient states the cortisone shot has seemed to help some already, the pain is almost gone and the swelling is diminished.     He will let us know if/when he needs another cortisone shot.

## 2023-11-17 NOTE — TELEPHONE ENCOUNTER
----- Message from Flaco Stubbs MD sent at 11/17/2023  1:03 PM EST -----  Please call Christophe and tell him initial fluid from his ankle is not consistent with an infection.  More consistent with a mild arthritis.  We did not see gout crystals in the fluid, so this may be more consistent with mild arthritis/synovitis.  If that is the case the Kenalog should help alleviate his pain.  Will have final cultures to rule out infection next week but unlikely positive.  Call and/or come in for repeat exam if pain returns.

## 2023-11-20 LAB
BACTERIA FLD CULT: NORMAL
GRAM STN SPEC: NORMAL
GRAM STN SPEC: NORMAL

## 2023-11-22 ENCOUNTER — HOSPITAL ENCOUNTER (OUTPATIENT)
Dept: CARDIOLOGY | Facility: HOSPITAL | Age: 73
Discharge: HOME | End: 2023-11-22
Payer: MEDICARE

## 2023-11-22 LAB
ATRIAL RATE: 50 BPM
P AXIS: 53 DEGREES
P OFFSET: 187 MS
P ONSET: 120 MS
PR INTERVAL: 202 MS
Q ONSET: 221 MS
QRS COUNT: 9 BEATS
QRS DURATION: 96 MS
QT INTERVAL: 458 MS
QTC CALCULATION(BAZETT): 417 MS
QTC FREDERICIA: 431 MS
R AXIS: 27 DEGREES
T AXIS: 16 DEGREES
T OFFSET: 450 MS
VENTRICULAR RATE: 50 BPM

## 2023-11-22 PROCEDURE — 93005 ELECTROCARDIOGRAM TRACING: CPT

## 2023-11-28 ENCOUNTER — OFFICE VISIT (OUTPATIENT)
Dept: CARDIOLOGY | Facility: CLINIC | Age: 73
End: 2023-11-28
Payer: MEDICARE

## 2023-11-28 VITALS
HEART RATE: 57 BPM | HEIGHT: 75 IN | WEIGHT: 175.1 LBS | BODY MASS INDEX: 21.77 KG/M2 | DIASTOLIC BLOOD PRESSURE: 69 MMHG | SYSTOLIC BLOOD PRESSURE: 123 MMHG

## 2023-11-28 DIAGNOSIS — I48.3 TYPICAL ATRIAL FLUTTER (MULTI): Primary | ICD-10-CM

## 2023-11-28 DIAGNOSIS — E78.5 HYPERLIPIDEMIA, UNSPECIFIED HYPERLIPIDEMIA TYPE: ICD-10-CM

## 2023-11-28 DIAGNOSIS — E78.2 MIXED HYPERLIPIDEMIA: ICD-10-CM

## 2023-11-28 PROCEDURE — 99214 OFFICE O/P EST MOD 30 MIN: CPT | Performed by: NURSE PRACTITIONER

## 2023-11-28 PROCEDURE — 1036F TOBACCO NON-USER: CPT | Performed by: NURSE PRACTITIONER

## 2023-11-28 PROCEDURE — 1159F MED LIST DOCD IN RCRD: CPT | Performed by: NURSE PRACTITIONER

## 2023-11-28 PROCEDURE — 1160F RVW MEDS BY RX/DR IN RCRD: CPT | Performed by: NURSE PRACTITIONER

## 2023-11-28 NOTE — PROGRESS NOTES
"Chief Complaint:   Atrial Flutter      History Of Present Illness:    Christophe Cedillo is a 73 y.o. male here with paroxysmal typical atrial flutter. he patient has been maintaining sinus rhythm on medical treatment which they are tolerating well and are compliant.  The current treatment strategy is rhythm control.  The CHADS2-VASC2 score is 1 and the ACC/AHA guidelines recommend anticoagulation for stroke prophylaxis.  The patient is being treated with Eliquis and has tolerated treatment with no bleeding and is compliant. He presented to [facility] and was found to be in NSR.     Feeling great. Went for a 3 mile run on Sunday without complaint.     Wearing smart watch. Today HR was in the  40s. This is normal for the patient.     Felt fatigued when he was in aflutter/fib. Heart rates were 65-121bpm.   His lowest heart rate was 20 beats higher than his normal.      Allergies:  Patient has no known allergies.    Review of Systems  All pertinent systems have been reviewed and are negative except for what is stated in the history of present illness.    All other systems have been reviewed and are negative and noncontributory to this patient's current ailments.     Visit Vitals  /69 (BP Location: Right arm, Patient Position: Sitting, BP Cuff Size: Adult)   Pulse 57   Ht 1.905 m (6' 3\")   Wt 79.4 kg (175 lb 1.6 oz)   BMI 21.89 kg/m²   Smoking Status Never   BSA 2.05 m²         Objective   Vitals reviewed.   Constitutional:       Appearance: Healthy appearance. Not in distress.   Neck:      Vascular: No JVR. JVD normal.   Pulmonary:      Effort: Pulmonary effort is normal.      Breath sounds: Normal breath sounds. No wheezing. No rhonchi. No rales.   Chest:      Chest wall: Not tender to palpatation.   Cardiovascular:      PMI at left midclavicular line. Normal rate. Regular rhythm. Normal S1. Normal S2.       Murmurs: There is no murmur.      No gallop.  No click. No rub.   Edema:     Peripheral edema absent.   Abdominal:    "   General: Bowel sounds are normal.      Palpations: Abdomen is soft.      Tenderness: There is no abdominal tenderness.   Musculoskeletal: Normal range of motion.         General: No tenderness. Skin:     General: Skin is warm and dry.   Neurological:      General: No focal deficit present.      Mental Status: Alert and oriented to person, place and time.       Assessment/Plan   Diagnoses and all orders for this visit:  Typical atrial flutter (CMS/HCC)  - Self converted to NSR  - [id number] score:1  - Discussed risk of CVA with patient and due to being relatively asymptomatic with afib/flutter he will continue Eliquis 5mg BID  - no beta blocker due to baseline SB  - he will monitor HR. If develops fatigue with arrhythmia again we can look at another CVN.  Hyperlipidemia, unspecified hyperlipidemia type  - stable  - statin      Current Outpatient Medications:     apixaban (Eliquis) 5 mg tablet, Take 1 tablet (5 mg) by mouth 2 times a day., Disp: 60 tablet, Rfl: 1    atorvastatin (Lipitor) 10 mg tablet, Take 1 tablet (10 mg) by mouth once daily in the morning. Take before meals., Disp: 90 tablet, Rfl: 1    tadalafil (Cialis) 5 mg tablet, TAKE 1 TABLET BY MOUTH EVERY DAY, Disp: 90 tablet, Rfl: 1    tamsulosin (Flomax) 0.4 mg 24 hr capsule, Take 1 capsule (0.4 mg) by mouth once daily., Disp: , Rfl:

## 2023-12-07 ENCOUNTER — OFFICE VISIT (OUTPATIENT)
Dept: ORTHOPEDIC SURGERY | Facility: CLINIC | Age: 73
End: 2023-12-07
Payer: MEDICARE

## 2023-12-07 VITALS — BODY MASS INDEX: 21.76 KG/M2 | WEIGHT: 175 LBS | HEIGHT: 75 IN

## 2023-12-07 DIAGNOSIS — M19.079 ARTHRITIS OF ANKLE: Primary | ICD-10-CM

## 2023-12-07 PROCEDURE — 1159F MED LIST DOCD IN RCRD: CPT | Performed by: SPECIALIST

## 2023-12-07 PROCEDURE — 99213 OFFICE O/P EST LOW 20 MIN: CPT | Performed by: SPECIALIST

## 2023-12-07 PROCEDURE — 1160F RVW MEDS BY RX/DR IN RCRD: CPT | Performed by: SPECIALIST

## 2023-12-07 PROCEDURE — 1036F TOBACCO NON-USER: CPT | Performed by: SPECIALIST

## 2023-12-07 NOTE — PROGRESS NOTES
Left ankle follow up.   Aspiration / injection done 11/16/2023 helped alleviate some pain.   No gout crystals were noted in fluid - consistent with mild arthritis.   States he is here because he just wanted to get it looked at again and make sure he isnt doing anything to make it worse.     Exam: Left ankle with no erythema warmth or swelling today.  Full stable range of motion.  Cannot elicit pain with either stress testing which is stable or palpation in multiple regions.  Dermis intact neurovascular intact    Assessment/plan: Mild left ankle osteoarthritis with recent flare of synovitis/effusion.  Based on his aspiration injection this apparently was not gout.  Either way he is doing excellent and has returned to running and just wanted a reassuring exam today.  Otherwise follow-up as needed

## 2023-12-13 ENCOUNTER — OFFICE VISIT (OUTPATIENT)
Dept: CARDIOLOGY | Facility: CLINIC | Age: 73
End: 2023-12-13
Payer: MEDICARE

## 2023-12-13 VITALS
TEMPERATURE: 98.2 F | SYSTOLIC BLOOD PRESSURE: 120 MMHG | HEIGHT: 75 IN | HEART RATE: 58 BPM | BODY MASS INDEX: 21.77 KG/M2 | DIASTOLIC BLOOD PRESSURE: 76 MMHG | WEIGHT: 175.1 LBS

## 2023-12-13 DIAGNOSIS — E78.2 MIXED HYPERLIPIDEMIA: ICD-10-CM

## 2023-12-13 DIAGNOSIS — I48.3 TYPICAL ATRIAL FLUTTER (MULTI): Primary | ICD-10-CM

## 2023-12-13 PROCEDURE — 1159F MED LIST DOCD IN RCRD: CPT | Performed by: NURSE PRACTITIONER

## 2023-12-13 PROCEDURE — 99213 OFFICE O/P EST LOW 20 MIN: CPT | Performed by: NURSE PRACTITIONER

## 2023-12-13 PROCEDURE — 1036F TOBACCO NON-USER: CPT | Performed by: NURSE PRACTITIONER

## 2023-12-13 PROCEDURE — 1160F RVW MEDS BY RX/DR IN RCRD: CPT | Performed by: NURSE PRACTITIONER

## 2023-12-13 NOTE — PROGRESS NOTES
"Chief Complaint:   Atrial Flutter      History Of Present Illness:    Christophe Cedillo is a 73 y.o. male here with paroxysmal typical atrial flutter. he patient has been maintaining sinus rhythm on medical treatment which they are tolerating well and are compliant.  The current treatment strategy is rhythm control.  The CHADS2-VASC2 score is 1 and the ACC/AHA guidelines recommend anticoagulation for stroke prophylaxis.  The patient is being treated with Eliquis and has tolerated treatment with no bleeding and is compliant. He presented to [facility] and was found to be in NSR.     Ran 10.5 miles. Avg HR 140bpm, peaked at 180bpm. Felt fine. Avg resting HR 50-60s. HR after race 90-100bpm for a few hours. This made him concerned.   Monday HR was back to normal. Was not fatigued when this was occurring. Was fatigued when he was in fib/flutter prior.     Felt fatigued when he was in aflutter/fib. Heart rates were 65-121bpm.   His lowest heart rate was 20 beats higher than his normal.      Allergies:  Patient has no known allergies.    Review of Systems  All pertinent systems have been reviewed and are negative except for what is stated in the history of present illness.    All other systems have been reviewed and are negative and noncontributory to this patient's current ailments.     Visit Vitals  /76   Pulse 58   Temp 36.8 °C (98.2 °F)   Ht 1.905 m (6' 3\")   Wt 79.4 kg (175 lb 1.6 oz)   BMI 21.89 kg/m²   Smoking Status Never   BSA 2.05 m²       Objective   Vitals reviewed.   Constitutional:       Appearance: Healthy appearance. Not in distress.   Neck:      Vascular: No JVR. JVD normal.   Pulmonary:      Effort: Pulmonary effort is normal.      Breath sounds: Normal breath sounds. No wheezing. No rhonchi. No rales.   Chest:      Chest wall: Not tender to palpatation.   Cardiovascular:      PMI at left midclavicular line. Normal rate. Regular rhythm. Normal S1. Normal S2.       Murmurs: There is no murmur.      No gallop.  " No click. No rub.   Edema:     Peripheral edema absent.   Abdominal:      General: Bowel sounds are normal.      Palpations: Abdomen is soft.      Tenderness: There is no abdominal tenderness.   Musculoskeletal: Normal range of motion.         General: No tenderness. Skin:     General: Skin is warm and dry.   Neurological:      General: No focal deficit present.      Mental Status: Alert and oriented to person, place and time.       Assessment/Plan   Diagnoses and all orders for this visit:  Typical atrial flutter (CMS/HCC)  - Maintaining NSR  - we discussed how to determine if patient is in afib vs NSR  - [id number] score:1  - continue Eliquis   - no beta blocker due to baseline SB  - he will monitor HR. If develops fatigue with arrhythmia again we can look at another CVN.  Hyperlipidemia, unspecified hyperlipidemia type  - stable  - statin      Current Outpatient Medications:     apixaban (Eliquis) 5 mg tablet, Take 1 tablet (5 mg) by mouth 2 times a day., Disp: 60 tablet, Rfl: 1    atorvastatin (Lipitor) 10 mg tablet, Take 1 tablet (10 mg) by mouth once daily in the morning. Take before meals., Disp: 90 tablet, Rfl: 1    tadalafil (Cialis) 5 mg tablet, TAKE 1 TABLET BY MOUTH EVERY DAY, Disp: 90 tablet, Rfl: 1    tamsulosin (Flomax) 0.4 mg 24 hr capsule, Take 1 capsule (0.4 mg) by mouth once daily., Disp: , Rfl:

## 2023-12-19 DIAGNOSIS — R53.83 OTHER FATIGUE: ICD-10-CM

## 2023-12-19 DIAGNOSIS — J06.9 VIRAL UPPER RESPIRATORY TRACT INFECTION: ICD-10-CM

## 2023-12-19 DIAGNOSIS — I48.92 ATRIAL FLUTTER, UNSPECIFIED TYPE (MULTI): ICD-10-CM

## 2023-12-19 DIAGNOSIS — R00.0 INCREASED HEART RATE: ICD-10-CM

## 2023-12-19 DIAGNOSIS — R09.89 SYMPTOMS OF UPPER RESPIRATORY INFECTION (URI): ICD-10-CM

## 2024-05-05 DIAGNOSIS — E78.2 MIXED HYPERLIPIDEMIA: ICD-10-CM

## 2024-05-05 DIAGNOSIS — N52.9 VASCULOGENIC ERECTILE DYSFUNCTION, UNSPECIFIED VASCULOGENIC ERECTILE DYSFUNCTION TYPE: ICD-10-CM

## 2024-05-05 DIAGNOSIS — N40.1 BENIGN PROSTATIC HYPERPLASIA WITH URINARY HESITANCY: ICD-10-CM

## 2024-05-05 DIAGNOSIS — R39.11 BENIGN PROSTATIC HYPERPLASIA WITH URINARY HESITANCY: ICD-10-CM

## 2024-05-06 RX ORDER — ATORVASTATIN CALCIUM 10 MG/1
10 TABLET, FILM COATED ORAL
Qty: 90 TABLET | Refills: 1 | Status: SHIPPED | OUTPATIENT
Start: 2024-05-06

## 2024-07-22 ENCOUNTER — TELEPHONE (OUTPATIENT)
Dept: PRIMARY CARE | Facility: CLINIC | Age: 74
End: 2024-07-22
Payer: MEDICARE

## 2024-07-22 DIAGNOSIS — U07.1 COVID: Primary | ICD-10-CM

## 2024-07-22 RX ORDER — NIRMATRELVIR AND RITONAVIR 150-100 MG
2 KIT ORAL 2 TIMES DAILY
Qty: 20 TABLET | Refills: 0 | Status: SHIPPED | OUTPATIENT
Start: 2024-07-22 | End: 2024-07-27

## 2024-07-22 NOTE — TELEPHONE ENCOUNTER
Patient called tested positive for covid. Sxs: congestion, headache, sore throat, cough. Wondering if you could send something into the pharmacy for him?

## 2024-08-14 ENCOUNTER — TELEPHONE (OUTPATIENT)
Dept: CARDIOLOGY | Facility: CLINIC | Age: 74
End: 2024-08-14
Payer: MEDICARE

## 2024-08-14 ENCOUNTER — HOSPITAL ENCOUNTER (OUTPATIENT)
Dept: CARDIOLOGY | Facility: CLINIC | Age: 74
Discharge: HOME | End: 2024-08-14
Payer: MEDICARE

## 2024-08-14 ENCOUNTER — OFFICE VISIT (OUTPATIENT)
Dept: CARDIOLOGY | Facility: CLINIC | Age: 74
End: 2024-08-14
Payer: MEDICARE

## 2024-08-14 VITALS
SYSTOLIC BLOOD PRESSURE: 103 MMHG | HEART RATE: 71 BPM | WEIGHT: 179 LBS | BODY MASS INDEX: 22.37 KG/M2 | DIASTOLIC BLOOD PRESSURE: 68 MMHG

## 2024-08-14 DIAGNOSIS — I48.3 TYPICAL ATRIAL FLUTTER (MULTI): Primary | ICD-10-CM

## 2024-08-14 LAB
ANION GAP SERPL CALC-SCNC: 14 MMOL/L (ref 10–20)
ATRIAL RATE: 288 BPM
BASOPHILS # BLD AUTO: 0.02 X10*3/UL (ref 0–0.1)
BASOPHILS NFR BLD AUTO: 0.4 %
BUN SERPL-MCNC: 29 MG/DL (ref 6–23)
CALCIUM SERPL-MCNC: 9.1 MG/DL (ref 8.6–10.3)
CHLORIDE SERPL-SCNC: 101 MMOL/L (ref 98–107)
CO2 SERPL-SCNC: 25 MMOL/L (ref 21–32)
CREAT SERPL-MCNC: 1.31 MG/DL (ref 0.5–1.3)
EGFRCR SERPLBLD CKD-EPI 2021: 57 ML/MIN/1.73M*2
EOSINOPHIL # BLD AUTO: 0.1 X10*3/UL (ref 0–0.4)
EOSINOPHIL NFR BLD AUTO: 2.2 %
ERYTHROCYTE [DISTWIDTH] IN BLOOD BY AUTOMATED COUNT: 11.9 % (ref 11.5–14.5)
GLUCOSE SERPL-MCNC: 85 MG/DL (ref 74–99)
HCT VFR BLD AUTO: 45.1 % (ref 41–52)
HGB BLD-MCNC: 15.7 G/DL (ref 13.5–17.5)
IMM GRANULOCYTES # BLD AUTO: 0.01 X10*3/UL (ref 0–0.5)
IMM GRANULOCYTES NFR BLD AUTO: 0.2 % (ref 0–0.9)
LYMPHOCYTES # BLD AUTO: 1.38 X10*3/UL (ref 0.8–3)
LYMPHOCYTES NFR BLD AUTO: 30.6 %
MCH RBC QN AUTO: 33 PG (ref 26–34)
MCHC RBC AUTO-ENTMCNC: 34.8 G/DL (ref 32–36)
MCV RBC AUTO: 95 FL (ref 80–100)
MONOCYTES # BLD AUTO: 0.71 X10*3/UL (ref 0.05–0.8)
MONOCYTES NFR BLD AUTO: 15.7 %
NEUTROPHILS # BLD AUTO: 2.29 X10*3/UL (ref 1.6–5.5)
NEUTROPHILS NFR BLD AUTO: 50.9 %
NRBC BLD-RTO: 0 /100 WBCS (ref 0–0)
P AXIS: 93 DEGREES
P OFFSET: 179 MS
P ONSET: 133 MS
PLATELET # BLD AUTO: 223 X10*3/UL (ref 150–450)
POTASSIUM SERPL-SCNC: 4.7 MMOL/L (ref 3.5–5.3)
Q ONSET: 225 MS
QRS COUNT: 12 BEATS
QRS DURATION: 78 MS
QT INTERVAL: 364 MS
QTC CALCULATION(BAZETT): 398 MS
QTC FREDERICIA: 387 MS
R AXIS: 62 DEGREES
RBC # BLD AUTO: 4.76 X10*6/UL (ref 4.5–5.9)
SODIUM SERPL-SCNC: 135 MMOL/L (ref 136–145)
T AXIS: 55 DEGREES
T OFFSET: 407 MS
VENTRICULAR RATE: 72 BPM
WBC # BLD AUTO: 4.5 X10*3/UL (ref 4.4–11.3)

## 2024-08-14 PROCEDURE — 1159F MED LIST DOCD IN RCRD: CPT | Performed by: NURSE PRACTITIONER

## 2024-08-14 PROCEDURE — 85025 COMPLETE CBC W/AUTO DIFF WBC: CPT | Performed by: NURSE PRACTITIONER

## 2024-08-14 PROCEDURE — 80048 BASIC METABOLIC PNL TOTAL CA: CPT | Performed by: NURSE PRACTITIONER

## 2024-08-14 PROCEDURE — 1160F RVW MEDS BY RX/DR IN RCRD: CPT | Performed by: NURSE PRACTITIONER

## 2024-08-14 PROCEDURE — 1036F TOBACCO NON-USER: CPT | Performed by: NURSE PRACTITIONER

## 2024-08-14 PROCEDURE — 36415 COLL VENOUS BLD VENIPUNCTURE: CPT | Performed by: NURSE PRACTITIONER

## 2024-08-14 PROCEDURE — 99215 OFFICE O/P EST HI 40 MIN: CPT | Performed by: NURSE PRACTITIONER

## 2024-08-14 PROCEDURE — 93005 ELECTROCARDIOGRAM TRACING: CPT

## 2024-08-14 NOTE — PROGRESS NOTES
Chief Complaint:   Atrial Flutter      History Of Present Illness:    Christophe Cedillo is a 73 y.o. male here with paroxysmal typical atrial flutter.     He called of our office with concerns of being in atrial flutter again. States he is feeling lightheaded, dizzy. He is a regularly runner and reports that he has become fatigued quicker. He does note his heart rate is becoming more elevated with exercise.     EKG today confirms typical aflutter at 72bpm. Normal resting heart rate, for patient, is 40-50s.     Patient presented to our office in aflutter in October. Planned for a CVN on 11/10. When he presented for CVN he self converted.     The current treatment strategy is rhythm control.  The CHADS2-VASC2 score is 1 and the ACC/AHA guidelines recommend anticoagulation for stroke prophylaxis.  The patient is being treated with Eliquis and has tolerated treatment with no bleeding and is compliant     Allergies:  Patient has no known allergies.    Review of Systems  All pertinent systems have been reviewed and are negative except for what is stated in the history of present illness.    All other systems have been reviewed and are negative and noncontributory to this patient's current ailments.     Visit Vitals  /68 (BP Location: Right arm, Patient Position: Sitting, BP Cuff Size: Adult)   Pulse 71   Wt 81.2 kg (179 lb)   BMI 22.37 kg/m²   Smoking Status Never   BSA 2.07 m²       Objective   Vitals reviewed.   Constitutional:       Appearance: Healthy appearance. Not in distress.   Neck:      Vascular: No JVR. JVD normal.   Pulmonary:      Effort: Pulmonary effort is normal.      Breath sounds: Normal breath sounds. No wheezing. No rhonchi. No rales.   Chest:      Chest wall: Not tender to palpatation.   Cardiovascular:      PMI at left midclavicular line. Normal rate. Regular rhythm. Normal S1. Normal S2.       Murmurs: There is no murmur.      No gallop.  No click. No rub.   Edema:     Peripheral edema absent.    Abdominal:      General: Bowel sounds are normal.      Palpations: Abdomen is soft.      Tenderness: There is no abdominal tenderness.   Musculoskeletal: Normal range of motion.         General: No tenderness. Skin:     General: Skin is warm and dry.   Neurological:      General: No focal deficit present.      Mental Status: Alert and oriented to person, place and time.     Assessment/Plan   Diagnoses and all orders for this visit:  Typical atrial flutter (CMS/HCC)  - now back in aflutter  - has been complaint with Eliquis and has not missed any doses   - plan for CVN due to symptomatic nature of atrial flutter  - patient referred for RFA consideration   Hyperlipidemia, unspecified hyperlipidemia type  - stable  - statin    Follow up post CVN       Current Outpatient Medications:     apixaban (Eliquis) 5 mg tablet, Take 1 tablet (5 mg) by mouth 2 times a day., Disp: 180 tablet, Rfl: 3    atorvastatin (Lipitor) 10 mg tablet, TAKE 1 TABLET (10 MG) BY MOUTH ONCE DAILY IN THE MORNING. TAKE BEFORE MEALS., Disp: 90 tablet, Rfl: 1    tadalafil (Cialis) 5 mg tablet, TAKE 1 TABLET BY MOUTH EVERY DAY, Disp: 90 tablet, Rfl: 1    tamsulosin (Flomax) 0.4 mg 24 hr capsule, Take 1 capsule (0.4 mg) by mouth once daily., Disp: , Rfl:     Exclusive of any other services or procedures performed, I, Kya GARY, spent 30 minutes in duration for this visit today.  This time consisted of chart review, obtaining history, and/or performing the exam as documented above, as well as, documenting the clinical information for the encounter in the electronic record, discussing treatment options, plans, and/or goals with patient, family, and/or caregiver, refilling medications, updating the electronic record, ordering medicines, lab work, imaging, referrals, and/or procedures as documented above and communicating with other [facility] professionals. I have discussed the results of laboratory, radiology, and cardiology studies with the  patient and their family/caregiver.

## 2024-08-15 DIAGNOSIS — I48.3 TYPICAL ATRIAL FLUTTER (MULTI): Primary | ICD-10-CM

## 2024-08-26 DIAGNOSIS — I48.3 TYPICAL ATRIAL FLUTTER (MULTI): Primary | ICD-10-CM

## 2024-08-28 ENCOUNTER — TELEPHONE (OUTPATIENT)
Dept: CARDIOLOGY | Facility: CLINIC | Age: 74
End: 2024-08-28
Payer: MEDICARE

## 2024-08-28 ENCOUNTER — HOSPITAL ENCOUNTER (OUTPATIENT)
Dept: CARDIOLOGY | Facility: CLINIC | Age: 74
Discharge: HOME | End: 2024-08-28
Payer: MEDICARE

## 2024-08-28 PROCEDURE — 93005 ELECTROCARDIOGRAM TRACING: CPT

## 2024-09-03 ENCOUNTER — APPOINTMENT (OUTPATIENT)
Dept: ORTHOPEDIC SURGERY | Facility: CLINIC | Age: 74
End: 2024-09-03
Payer: MEDICARE

## 2024-09-03 ENCOUNTER — HOSPITAL ENCOUNTER (OUTPATIENT)
Dept: RADIOLOGY | Facility: CLINIC | Age: 74
Discharge: HOME | End: 2024-09-03
Payer: MEDICARE

## 2024-09-03 VITALS — HEIGHT: 75 IN | WEIGHT: 179 LBS | BODY MASS INDEX: 22.26 KG/M2

## 2024-09-03 DIAGNOSIS — M25.551 RIGHT HIP PAIN: ICD-10-CM

## 2024-09-03 DIAGNOSIS — M16.11 PRIMARY OSTEOARTHRITIS OF RIGHT HIP: Primary | ICD-10-CM

## 2024-09-03 LAB
ATRIAL RATE: 288 BPM
ATRIAL RATE: 57 BPM
P AXIS: 61 DEGREES
P AXIS: 93 DEGREES
P OFFSET: 159 MS
P OFFSET: 179 MS
P ONSET: 125 MS
P ONSET: 133 MS
PR INTERVAL: 192 MS
Q ONSET: 221 MS
Q ONSET: 225 MS
QRS COUNT: 12 BEATS
QRS COUNT: 9 BEATS
QRS DURATION: 78 MS
QRS DURATION: 92 MS
QT INTERVAL: 364 MS
QT INTERVAL: 420 MS
QTC CALCULATION(BAZETT): 398 MS
QTC CALCULATION(BAZETT): 408 MS
QTC FREDERICIA: 387 MS
QTC FREDERICIA: 413 MS
R AXIS: 62 DEGREES
R AXIS: 63 DEGREES
T AXIS: 38 DEGREES
T AXIS: 55 DEGREES
T OFFSET: 407 MS
T OFFSET: 431 MS
VENTRICULAR RATE: 57 BPM
VENTRICULAR RATE: 72 BPM

## 2024-09-03 PROCEDURE — 99214 OFFICE O/P EST MOD 30 MIN: CPT | Performed by: ORTHOPAEDIC SURGERY

## 2024-09-03 PROCEDURE — 1159F MED LIST DOCD IN RCRD: CPT | Performed by: ORTHOPAEDIC SURGERY

## 2024-09-03 PROCEDURE — 73502 X-RAY EXAM HIP UNI 2-3 VIEWS: CPT | Mod: RIGHT SIDE | Performed by: RADIOLOGY

## 2024-09-03 PROCEDURE — 3008F BODY MASS INDEX DOCD: CPT | Performed by: ORTHOPAEDIC SURGERY

## 2024-09-03 PROCEDURE — 1125F AMNT PAIN NOTED PAIN PRSNT: CPT | Performed by: ORTHOPAEDIC SURGERY

## 2024-09-03 PROCEDURE — 73502 X-RAY EXAM HIP UNI 2-3 VIEWS: CPT | Mod: RT

## 2024-09-03 PROCEDURE — 1036F TOBACCO NON-USER: CPT | Performed by: ORTHOPAEDIC SURGERY

## 2024-09-03 ASSESSMENT — PAIN SCALES - GENERAL: PAINLEVEL_OUTOF10: 3

## 2024-09-03 ASSESSMENT — PAIN - FUNCTIONAL ASSESSMENT: PAIN_FUNCTIONAL_ASSESSMENT: 0-10

## 2024-09-03 NOTE — LETTER
September 3, 2024     Luther Archer MD  [address] [place] [address]    Patient: Christophe Cedillo   YOB: 1950   Date of Visit: 9/3/2024       Dear Dr. Luther Archer MD:    Thank you for referring Christophe Cedillo to me for evaluation. Below are my notes for this consultation.  If you have questions, please do not hesitate to call me. I look forward to following your patient along with you.       Sincerely,     Cathi Smallwood,       CC: No Recipients  ______________________________________________________________________________________    PRIMARY CARE PHYSICIAN: Luther Archer MD  REFERRING PROVIDER: No referring provider defined for this encounter.     CONSULT ORTHOPAEDIC: Hip Evaluation        ASSESSMENT & PLAN    IMPRESSION:  1.  Primary osteoarthritis, right hip in the setting of hip impingement    PLAN:  Had a lengthy discussion the patient about the findings above and reviewed his current x-rays with him.  He starting arthritic changes in his right hip due to his underlying hip impingement.  He is responding well to conservative treatments occasional Tylenol but we did discuss with him that given that he is on Eliquis he is unfortunately unable to take anti-inflammatory medications for pain control.  At this point he does not feel like his quality life is affected and would like to consider conservative management and continue work with his .  We potentially consider a steroid injection of the right hip in the future for pain control as needed.  Patient is agree with this plan of care.  Will continue with activities as tolerated and follow-up as needed.      SUBJECTIVE  CHIEF COMPLAINT:   Chief Complaint   Patient presents with   • Right Hip - Pain        HPI: Christophe Cedillo is a 73 y.o. patient. Christophe Cedillo has had progressive problems with the right hip over the past 2 years. They do not report any trauma. They do not report any constant or progressive numbness or  tingling in their legs. Their symptoms are interfering with activities which include localized lateral and deep pain in there hip after exercises or bending down. Any activity with bending the hip up irritates it. Still runs for exercise, does not take any medications for it.     FUNCTIONAL STATUS: not limited.  AMBULATORY STATUS:  independent  PREVIOUS TREATMENTS: Therapy  still taking  Tylenol with mile improvement  HISTORY OF SURGERY ON AFFECTED HIP(S): No   BACK PAIN REPORTED: No       REVIEW OF SYSTEMS  Constitutional: See HPI for pain assessment, No significant weight loss, recent trauma  Cardiovascular: No chest pain, shortness of breath  Respiratory: No difficulty breathing, cough  Gastrointestinal: No nausea, vomiting, diarrhea, constipation  Musculoskeletal: Noted in HPI, positive for pain, restricted motion, stiffness  Integumentary: No rashes, easy bruising, redness   Neurological: no numbness or tingling in extremities, no gait disturbances   Psychiatric: No mood changes, memory changes, social issues  Heme/Lymph: no excessive swelling, easy bruising, excessive bleeding  ENT: No hearing changes  Eyes: No vision changes    Past Medical History:   Diagnosis Date   • Disorder of lipoprotein metabolism, unspecified     Borderline high cholesterol   • Headache, unspecified     Chronic headaches   • Personal history of other diseases of the musculoskeletal system and connective tissue     History of gout   • Typical atrial flutter (Multi) 10/16/2023        No Known Allergies     Past Surgical History:   Procedure Laterality Date   • KIDNEY STONE SURGERY     • OTHER SURGICAL HISTORY  07/14/2022    No history of surgery        Family History   Problem Relation Name Age of Onset   • Heart failure Mother     • Other (old age) Father          Social History     Socioeconomic History   • Marital status:      Spouse name: Not on file   • Number of children: Not on file   • Years of education:  "Not on file   • Highest education level: Not on file   Occupational History   • Not on file   Tobacco Use   • Smoking status: Never   • Smokeless tobacco: Never   Vaping Use   • Vaping status: Never Used   Substance and Sexual Activity   • Alcohol use: Not on file     Comment: rarely   • Drug use: Never   • Sexual activity: Not on file   Other Topics Concern   • Not on file   Social History Narrative   • Not on file     Social Determinants of Health     Financial Resource Strain: Not on file   Food Insecurity: Not on file   Transportation Needs: Not on file   Physical Activity: Not on file   Stress: Not on file   Social Connections: Not on file   Intimate Partner Violence: Not on file   Housing Stability: Not on file        CURRENT MEDICATIONS:   Current Outpatient Medications   Medication Sig Dispense Refill   • apixaban (Eliquis) 5 mg tablet Take 1 tablet (5 mg) by mouth 2 times a day. 180 tablet 3   • atorvastatin (Lipitor) 10 mg tablet TAKE 1 TABLET (10 MG) BY MOUTH ONCE DAILY IN THE MORNING. TAKE BEFORE MEALS. 90 tablet 1   • tadalafil (Cialis) 5 mg tablet TAKE 1 TABLET BY MOUTH EVERY DAY 90 tablet 1   • tamsulosin (Flomax) 0.4 mg 24 hr capsule Take 1 capsule (0.4 mg) by mouth once daily.       No current facility-administered medications for this visit.        OBJECTIVE    PHYSICAL EXAM      10/13/2023     1:17 PM 10/16/2023    10:42 AM 11/16/2023    11:56 AM 11/28/2023    10:18 AM 12/7/2023    10:12 AM 12/13/2023    10:49 AM 8/14/2024     2:54 PM   Vitals   Systolic 100 148  123  120 103   Diastolic 70 90  69  76 68   Heart Rate 74 77  57  58 71   Temp 36.6 °C (97.8 °F) 36.8 °C (98.2 °F)    36.8 °C (98.2 °F)    Resp 16         Height (in)  1.905 m (6' 3\") 1.905 m (6' 3\") 1.905 m (6' 3\") 1.905 m (6' 3\") 1.905 m (6' 3\")    Weight (lb) 180 181.6 180 175.1 175 175.1 179   BMI 22.5 kg/m2 22.7 kg/m2 22.5 kg/m2 21.89 kg/m2 21.87 kg/m2 21.89 kg/m2 22.37 kg/m2   BSA (m2) 2.08 m2 2.09 m2 2.08 m2 2.05 m2 2.05 m2 2.05 m2 " 2.07 m2   Visit Report Report Report Report Report Report Report Report      There is no height or weight on file to calculate BMI.    GENERAL: A/Ox3, NAD. Appears healthy, well nourished  PSYCHIATRIC: Mood stable, appropriate memory recall  EYES: EOM intact, no scleral icterus  CARDIAC: regular rate  LUNGS: Breathing non-labored  SKIN: no erythema, rashes, or ecchymoses     MUSCULOSKELETAL:  Laterality: right Hip Exam  - ROM, Extension: full, no flexion contracture  - Strength: Abduction 5/5 against resitance, Flexion 5/5  - Palpation: mild TTP along greater trochanter  - Log roll/IR exam: painful and limited motion. Pain with hip flexion past 90 degrees and internal rotation  - Straight leg raise: negative  - EHL/PF/DF motor intact  - Gait: antalgic to arthritic side, negative Trendelenburg gait   - Special Tests: none performed    NEUROVASCULAR:  - Neurovascular Status: sensation intact to light touch distally  - Capillary refill brisk at extremities, Bilateral dorsalis pedis pulse 2+       IMAGING:  Multiple views of the affected right hip(s) demonstrate: Moderate arthritic changes noted in the setting of hip impingement with joint space narrowing, subchondral sclerosis, subchondral cystic change, marginal osteophytic change.   X-rays were personally reviewed and interpreted by me.  Radiology reports were reviewed by me as well, if readily available at the time.        Cathi Smallwood DO  Attending Surgeon  Joint Replacement and Adult Reconstructive Surgery  [place], [place]

## 2024-09-03 NOTE — PROGRESS NOTES
PRIMARY CARE PHYSICIAN: Luther Archer MD  REFERRING PROVIDER: No referring provider defined for this encounter.     CONSULT ORTHOPAEDIC: Hip Evaluation        ASSESSMENT & PLAN    IMPRESSION:  1.  Primary osteoarthritis, right hip in the setting of hip impingement    PLAN:  Had a lengthy discussion the patient about the findings above and reviewed his current x-rays with him.  He starting arthritic changes in his right hip due to his underlying hip impingement.  He is responding well to conservative treatments occasional Tylenol but we did discuss with him that given that he is on Eliquis he is unfortunately unable to take anti-inflammatory medications for pain control.  At this point he does not feel like his quality life is affected and would like to consider conservative management and continue work with his .  We potentially consider a steroid injection of the right hip in the future for pain control as needed.  Patient is agree with this plan of care.  Will continue with activities as tolerated and follow-up as needed.      SUBJECTIVE  CHIEF COMPLAINT:   Chief Complaint   Patient presents with    Right Hip - Pain        HPI: Christophe Cedillo is a 73 y.o. patient. Christophe Cedillo has had progressive problems with the right hip over the past 2 years. They do not report any trauma. They do not report any constant or progressive numbness or tingling in their legs. Their symptoms are interfering with activities which include localized lateral and deep pain in there hip after exercises or bending down. Any activity with bending the hip up irritates it. Still runs for exercise, does not take any medications for it.     FUNCTIONAL STATUS: not limited.  AMBULATORY STATUS:  independent  PREVIOUS TREATMENTS: Therapy  still taking  Tylenol with mile improvement  HISTORY OF SURGERY ON AFFECTED HIP(S): No   BACK PAIN REPORTED: No       REVIEW OF SYSTEMS  Constitutional: See HPI for pain assessment, No  significant weight loss, recent trauma  Cardiovascular: No chest pain, shortness of breath  Respiratory: No difficulty breathing, cough  Gastrointestinal: No nausea, vomiting, diarrhea, constipation  Musculoskeletal: Noted in HPI, positive for pain, restricted motion, stiffness  Integumentary: No rashes, easy bruising, redness   Neurological: no numbness or tingling in extremities, no gait disturbances   Psychiatric: No mood changes, memory changes, social issues  Heme/Lymph: no excessive swelling, easy bruising, excessive bleeding  ENT: No hearing changes  Eyes: No vision changes    Past Medical History:   Diagnosis Date    Disorder of lipoprotein metabolism, unspecified     Borderline high cholesterol    Headache, unspecified     Chronic headaches    Personal history of other diseases of the musculoskeletal system and connective tissue     History of gout    Typical atrial flutter (Multi) 10/16/2023        No Known Allergies     Past Surgical History:   Procedure Laterality Date    KIDNEY STONE SURGERY      OTHER SURGICAL HISTORY  07/14/2022    No history of surgery        Family History   Problem Relation Name Age of Onset    Heart failure Mother      Other (old age) Father          Social History     Socioeconomic History    Marital status:      Spouse name: Not on file    Number of children: Not on file    Years of education: Not on file    Highest education level: Not on file   Occupational History    Not on file   Tobacco Use    Smoking status: Never    Smokeless tobacco: Never   Vaping Use    Vaping status: Never Used   Substance and Sexual Activity    Alcohol use: Not on file     Comment: rarely    Drug use: Never    Sexual activity: Not on file   Other Topics Concern    Not on file   Social History Narrative    Not on file     Social Determinants of Health     Financial Resource Strain: Not on file   Food Insecurity: Not on file   Transportation Needs: Not on file   Physical Activity: Not on file  "  Stress: Not on file   Social Connections: Not on file   Intimate Partner Violence: Not on file   Housing Stability: Not on file        CURRENT MEDICATIONS:   Current Outpatient Medications   Medication Sig Dispense Refill    apixaban (Eliquis) 5 mg tablet Take 1 tablet (5 mg) by mouth 2 times a day. 180 tablet 3    atorvastatin (Lipitor) 10 mg tablet TAKE 1 TABLET (10 MG) BY MOUTH ONCE DAILY IN THE MORNING. TAKE BEFORE MEALS. 90 tablet 1    tadalafil (Cialis) 5 mg tablet TAKE 1 TABLET BY MOUTH EVERY DAY 90 tablet 1    tamsulosin (Flomax) 0.4 mg 24 hr capsule Take 1 capsule (0.4 mg) by mouth once daily.       No current facility-administered medications for this visit.        OBJECTIVE    PHYSICAL EXAM      10/13/2023     1:17 PM 10/16/2023    10:42 AM 11/16/2023    11:56 AM 11/28/2023    10:18 AM 12/7/2023    10:12 AM 12/13/2023    10:49 AM 8/14/2024     2:54 PM   Vitals   Systolic 100 148  123  120 103   Diastolic 70 90  69  76 68   Heart Rate 74 77  57  58 71   Temp 36.6 °C (97.8 °F) 36.8 °C (98.2 °F)    36.8 °C (98.2 °F)    Resp 16         Height (in)  1.905 m (6' 3\") 1.905 m (6' 3\") 1.905 m (6' 3\") 1.905 m (6' 3\") 1.905 m (6' 3\")    Weight (lb) 180 181.6 180 175.1 175 175.1 179   BMI 22.5 kg/m2 22.7 kg/m2 22.5 kg/m2 21.89 kg/m2 21.87 kg/m2 21.89 kg/m2 22.37 kg/m2   BSA (m2) 2.08 m2 2.09 m2 2.08 m2 2.05 m2 2.05 m2 2.05 m2 2.07 m2   Visit Report Report Report Report Report Report Report Report      There is no height or weight on file to calculate BMI.    GENERAL: A/Ox3, NAD. Appears healthy, well nourished  PSYCHIATRIC: Mood stable, appropriate memory recall  EYES: EOM intact, no scleral icterus  CARDIAC: regular rate  LUNGS: Breathing non-labored  SKIN: no erythema, rashes, or ecchymoses     MUSCULOSKELETAL:  Laterality: right Hip Exam  - ROM, Extension: full, no flexion contracture  - Strength: Abduction 5/5 against resitance, Flexion 5/5  - Palpation: mild TTP along greater trochanter  - Log roll/IR exam: " painful and limited motion. Pain with hip flexion past 90 degrees and internal rotation  - Straight leg raise: negative  - EHL/PF/DF motor intact  - Gait: antalgic to arthritic side, negative Trendelenburg gait   - Special Tests: none performed    NEUROVASCULAR:  - Neurovascular Status: sensation intact to light touch distally  - Capillary refill brisk at extremities, Bilateral dorsalis pedis pulse 2+       IMAGING:  Multiple views of the affected right hip(s) demonstrate: Moderate arthritic changes noted in the setting of hip impingement with joint space narrowing, subchondral sclerosis, subchondral cystic change, marginal osteophytic change.   X-rays were personally reviewed and interpreted by me.  Radiology reports were reviewed by me as well, if readily available at the time.        Cathi Smallwood DO  Attending Surgeon  Joint Replacement and Adult Reconstructive Surgery  [place], [place]

## 2024-09-13 ENCOUNTER — APPOINTMENT (OUTPATIENT)
Dept: CARDIOLOGY | Facility: HOSPITAL | Age: 74
End: 2024-09-13
Payer: MEDICARE

## 2024-09-22 NOTE — H&P (VIEW-ONLY)
Current Outpatient Medications   Medication Instructions    apixaban (ELIQUIS) 5 mg, oral, 2 times daily    atorvastatin (LIPITOR) 10 mg, oral, Daily before breakfast    tadalafil (CIALIS) 5 mg, oral, Daily    tamsulosin (FLOMAX) 0.4 mg, oral, Daily      Subjective   Christophe Cedillo is a 73 y.o. male.    Chief Complaint:  Atrial Flutter    HPI    Patient is referred by Kya Zamorano CNP for a possible RFA for A-flutter.    PMH includes: HLD, sinus bradycardia and atrial flutter (10/2023)    The patient was diagnosed with paroxysmal typical atrial flutter in October 2023 and was initially scheduled for cardioversion but self-converted. During his cardiology visit on August 14, 2024, he reported experiencing atrial flutter again, accompanied by lightheadedness and dizziness. As a regular runner, he has noticed increased fatigue and a more elevated heart rate during exercise.    An EKG performed during the visit confirmed typical atrial flutter at 72 bpm, although his normal resting heart rate is in the 40s-50s. His [id number] score is 1, and he has been treated with Eliquis, which he has tolerated well without bleeding complications.     His longest episodes was 3-4 weeks. His primary symptom is dizziness. He presents today to discuss the option of radiofrequency ablation for his atrial flutter.    TESTING    -ECHO:  None of file    - EVENT MONITOR 11/08/23:  AF (100%) AVG HR 68 with 4.8 s pause.     Review of Systems   Constitutional: Negative.   HENT: Negative.     Eyes: Negative.    Cardiovascular: Negative.    Respiratory: Negative.     Endocrine: Negative.    Skin: Negative.    Musculoskeletal: Negative.    Gastrointestinal: Negative.    Genitourinary: Negative.    Neurological: Negative.    Psychiatric/Behavioral: Negative.         Objective   Constitutional:       Appearance: Healthy appearance. Not in distress.   Eyes:      Pupils: Pupils are equal, round, and reactive to light.   Neck:      Thyroid: Thyroid  normal.      Vascular: JVD normal.   Pulmonary:      Effort: Pulmonary effort is normal.      Breath sounds: Normal breath sounds.   Cardiovascular:      Bradycardia present. Regular rhythm. S1 with normal intensity. S2 with normal intensity.       Murmurs: There is no murmur.      No gallop.  No click. No rub.   Edema:     Peripheral edema absent.   Musculoskeletal: Normal range of motion.      Cervical back: Normal range of motion and neck supple. Skin:     General: Skin is warm and moist.   Neurological:      General: No focal deficit present.      Mental Status: Alert and oriented to person, place and time.      Motor: Motor function is intact.      Gait: Gait is intact.         Lab Review:   Lab Results   Component Value Date     (L) 08/14/2024    K 4.7 08/14/2024     08/14/2024    CO2 25 08/14/2024    BUN 29 (H) 08/14/2024    CREATININE 1.31 (H) 08/14/2024    GLUCOSE 85 08/14/2024    CALCIUM 9.1 08/14/2024     Lab Results   Component Value Date    WBC 4.5 08/14/2024    HGB 15.7 08/14/2024    HCT 45.1 08/14/2024    MCV 95 08/14/2024     08/14/2024       Assessment/Plan    WE DISCUSSED THE RISKS AND BENEFITS OF AADs vs. CATHETER ABLATION FOR TYPICAL ATRIAL FLUTTER (NO PRIOR Hx OF AFIB).  HE UNDERSTANDS AND WANTS TO PROCEED WITH ABLATION.    SCHEDULE CTI DEPENDENT A FLUTTER ABLATION UNDER SEDATION  HOLD ALL MEDS IN THE MORNING OF ABLATION INCLUDING ELIQUIS  ENSITE SYSTEM - ANY EP LAB     WILL DC ELIQUIS 3-4 WEEKS AFTER ABLATION      MD Christophe Cruz Master Clinician of Cardiovascular [place].   [facility] [place].   Director of Electrophysiology Center  Professor of Medicine.   [employer].

## 2024-09-22 NOTE — PROGRESS NOTES
Current Outpatient Medications   Medication Instructions    apixaban (ELIQUIS) 5 mg, oral, 2 times daily    atorvastatin (LIPITOR) 10 mg, oral, Daily before breakfast    tadalafil (CIALIS) 5 mg, oral, Daily    tamsulosin (FLOMAX) 0.4 mg, oral, Daily      Subjective   Christophe Cedillo is a 73 y.o. male.    Chief Complaint:  Atrial Flutter    HPI    Patient is referred by Kya Zamorano CNP for a possible RFA for A-flutter.    PMH includes: HLD, sinus bradycardia and atrial flutter (10/2023)    The patient was diagnosed with paroxysmal typical atrial flutter in October 2023 and was initially scheduled for cardioversion but self-converted. During his cardiology visit on August 14, 2024, he reported experiencing atrial flutter again, accompanied by lightheadedness and dizziness. As a regular runner, he has noticed increased fatigue and a more elevated heart rate during exercise.    An EKG performed during the visit confirmed typical atrial flutter at 72 bpm, although his normal resting heart rate is in the 40s-50s. His [id number] score is 1, and he has been treated with Eliquis, which he has tolerated well without bleeding complications.     His longest episodes was 3-4 weeks. His primary symptom is dizziness. He presents today to discuss the option of radiofrequency ablation for his atrial flutter.    TESTING    -ECHO:  None of file    - EVENT MONITOR 11/08/23:  AF (100%) AVG HR 68 with 4.8 s pause.     Review of Systems   Constitutional: Negative.   HENT: Negative.     Eyes: Negative.    Cardiovascular: Negative.    Respiratory: Negative.     Endocrine: Negative.    Skin: Negative.    Musculoskeletal: Negative.    Gastrointestinal: Negative.    Genitourinary: Negative.    Neurological: Negative.    Psychiatric/Behavioral: Negative.         Objective   Constitutional:       Appearance: Healthy appearance. Not in distress.   Eyes:      Pupils: Pupils are equal, round, and reactive to light.   Neck:      Thyroid: Thyroid  normal.      Vascular: JVD normal.   Pulmonary:      Effort: Pulmonary effort is normal.      Breath sounds: Normal breath sounds.   Cardiovascular:      Bradycardia present. Regular rhythm. S1 with normal intensity. S2 with normal intensity.       Murmurs: There is no murmur.      No gallop.  No click. No rub.   Edema:     Peripheral edema absent.   Musculoskeletal: Normal range of motion.      Cervical back: Normal range of motion and neck supple. Skin:     General: Skin is warm and moist.   Neurological:      General: No focal deficit present.      Mental Status: Alert and oriented to person, place and time.      Motor: Motor function is intact.      Gait: Gait is intact.         Lab Review:   Lab Results   Component Value Date     (L) 08/14/2024    K 4.7 08/14/2024     08/14/2024    CO2 25 08/14/2024    BUN 29 (H) 08/14/2024    CREATININE 1.31 (H) 08/14/2024    GLUCOSE 85 08/14/2024    CALCIUM 9.1 08/14/2024     Lab Results   Component Value Date    WBC 4.5 08/14/2024    HGB 15.7 08/14/2024    HCT 45.1 08/14/2024    MCV 95 08/14/2024     08/14/2024       Assessment/Plan    WE DISCUSSED THE RISKS AND BENEFITS OF AADs vs. CATHETER ABLATION FOR TYPICAL ATRIAL FLUTTER (NO PRIOR Hx OF AFIB).  HE UNDERSTANDS AND WANTS TO PROCEED WITH ABLATION.    SCHEDULE CTI DEPENDENT A FLUTTER ABLATION UNDER SEDATION  HOLD ALL MEDS IN THE MORNING OF ABLATION INCLUDING ELIQUIS  ENSITE SYSTEM - ANY EP LAB     WILL DC ELIQUIS 3-4 WEEKS AFTER ABLATION      MD Christophe Cruz Master Clinician of Cardiovascular [place].   [facility] [place].   Director of Electrophysiology Center  Professor of Medicine.   [employer].

## 2024-09-23 ENCOUNTER — OFFICE VISIT (OUTPATIENT)
Dept: CARDIOLOGY | Facility: CLINIC | Age: 74
End: 2024-09-23
Payer: MEDICARE

## 2024-09-23 VITALS
WEIGHT: 180.5 LBS | BODY MASS INDEX: 22.44 KG/M2 | HEART RATE: 47 BPM | DIASTOLIC BLOOD PRESSURE: 72 MMHG | OXYGEN SATURATION: 98 % | SYSTOLIC BLOOD PRESSURE: 130 MMHG | HEIGHT: 75 IN

## 2024-09-23 DIAGNOSIS — I48.3 TYPICAL ATRIAL FLUTTER (MULTI): Primary | ICD-10-CM

## 2024-09-23 DIAGNOSIS — Z01.818 PREOP TESTING: ICD-10-CM

## 2024-09-23 PROCEDURE — 1036F TOBACCO NON-USER: CPT | Performed by: INTERNAL MEDICINE

## 2024-09-23 PROCEDURE — 3008F BODY MASS INDEX DOCD: CPT | Performed by: INTERNAL MEDICINE

## 2024-09-23 PROCEDURE — 1159F MED LIST DOCD IN RCRD: CPT | Performed by: INTERNAL MEDICINE

## 2024-09-23 PROCEDURE — 1160F RVW MEDS BY RX/DR IN RCRD: CPT | Performed by: INTERNAL MEDICINE

## 2024-09-23 PROCEDURE — 93005 ELECTROCARDIOGRAM TRACING: CPT | Performed by: INTERNAL MEDICINE

## 2024-09-23 PROCEDURE — 99204 OFFICE O/P NEW MOD 45 MIN: CPT | Performed by: INTERNAL MEDICINE

## 2024-09-23 PROCEDURE — 1126F AMNT PAIN NOTED NONE PRSNT: CPT | Performed by: INTERNAL MEDICINE

## 2024-09-23 PROCEDURE — 99214 OFFICE O/P EST MOD 30 MIN: CPT | Performed by: INTERNAL MEDICINE

## 2024-09-23 ASSESSMENT — ENCOUNTER SYMPTOMS
NEUROLOGICAL NEGATIVE: 1
CONSTITUTIONAL NEGATIVE: 1
EYES NEGATIVE: 1
OCCASIONAL FEELINGS OF UNSTEADINESS: 0
DEPRESSION: 0
ENDOCRINE NEGATIVE: 1
GASTROINTESTINAL NEGATIVE: 1
CARDIOVASCULAR NEGATIVE: 1
PSYCHIATRIC NEGATIVE: 1
LOSS OF SENSATION IN FEET: 0
MUSCULOSKELETAL NEGATIVE: 1
RESPIRATORY NEGATIVE: 1

## 2024-09-23 ASSESSMENT — PATIENT HEALTH QUESTIONNAIRE - PHQ9
1. LITTLE INTEREST OR PLEASURE IN DOING THINGS: NOT AT ALL
SUM OF ALL RESPONSES TO PHQ9 QUESTIONS 1 AND 2: 0
2. FEELING DOWN, DEPRESSED OR HOPELESS: NOT AT ALL

## 2024-09-23 ASSESSMENT — PAIN SCALES - GENERAL: PAINLEVEL: 0-NO PAIN

## 2024-10-09 ENCOUNTER — TELEPHONE (OUTPATIENT)
Dept: CARDIOLOGY | Facility: CLINIC | Age: 74
End: 2024-10-09
Payer: MEDICARE

## 2024-10-10 ENCOUNTER — LAB (OUTPATIENT)
Dept: LAB | Facility: LAB | Age: 74
End: 2024-10-10
Payer: MEDICARE

## 2024-10-10 DIAGNOSIS — I48.3 TYPICAL ATRIAL FLUTTER (MULTI): ICD-10-CM

## 2024-10-10 DIAGNOSIS — Z01.818 PREOP TESTING: ICD-10-CM

## 2024-10-10 LAB
ANION GAP SERPL CALC-SCNC: 15 MMOL/L (ref 10–20)
BUN SERPL-MCNC: 19 MG/DL (ref 6–23)
CALCIUM SERPL-MCNC: 10 MG/DL (ref 8.6–10.6)
CHLORIDE SERPL-SCNC: 102 MMOL/L (ref 98–107)
CO2 SERPL-SCNC: 28 MMOL/L (ref 21–32)
CREAT SERPL-MCNC: 1.11 MG/DL (ref 0.5–1.3)
EGFRCR SERPLBLD CKD-EPI 2021: 70 ML/MIN/1.73M*2
ERYTHROCYTE [DISTWIDTH] IN BLOOD BY AUTOMATED COUNT: 11.9 % (ref 11.5–14.5)
GLUCOSE SERPL-MCNC: 73 MG/DL (ref 74–99)
HCT VFR BLD AUTO: 46.2 % (ref 41–52)
HGB BLD-MCNC: 15.6 G/DL (ref 13.5–17.5)
MCH RBC QN AUTO: 32.3 PG (ref 26–34)
MCHC RBC AUTO-ENTMCNC: 33.8 G/DL (ref 32–36)
MCV RBC AUTO: 96 FL (ref 80–100)
NRBC BLD-RTO: 0 /100 WBCS (ref 0–0)
PLATELET # BLD AUTO: 222 X10*3/UL (ref 150–450)
POTASSIUM SERPL-SCNC: 4.7 MMOL/L (ref 3.5–5.3)
RBC # BLD AUTO: 4.83 X10*6/UL (ref 4.5–5.9)
SODIUM SERPL-SCNC: 140 MMOL/L (ref 136–145)
WBC # BLD AUTO: 3.8 X10*3/UL (ref 4.4–11.3)

## 2024-10-10 PROCEDURE — 80048 BASIC METABOLIC PNL TOTAL CA: CPT

## 2024-10-10 PROCEDURE — 85027 COMPLETE CBC AUTOMATED: CPT

## 2024-10-10 PROCEDURE — 36415 COLL VENOUS BLD VENIPUNCTURE: CPT

## 2024-10-10 PROCEDURE — 80061 LIPID PANEL: CPT

## 2024-10-11 ENCOUNTER — HOSPITAL ENCOUNTER (OUTPATIENT)
Facility: HOSPITAL | Age: 74
Setting detail: OUTPATIENT SURGERY
Discharge: HOME | End: 2024-10-11
Attending: INTERNAL MEDICINE | Admitting: INTERNAL MEDICINE
Payer: MEDICARE

## 2024-10-11 VITALS
RESPIRATION RATE: 12 BRPM | HEART RATE: 47 BPM | SYSTOLIC BLOOD PRESSURE: 161 MMHG | OXYGEN SATURATION: 100 % | DIASTOLIC BLOOD PRESSURE: 86 MMHG

## 2024-10-11 DIAGNOSIS — I48.3 TYPICAL ATRIAL FLUTTER (MULTI): ICD-10-CM

## 2024-10-11 PROCEDURE — 7100000010 HC PHASE TWO TIME - EACH INCREMENTAL 1 MINUTE: Performed by: INTERNAL MEDICINE

## 2024-10-11 PROCEDURE — 2500000004 HC RX 250 GENERAL PHARMACY W/ HCPCS (ALT 636 FOR OP/ED): Performed by: INTERNAL MEDICINE

## 2024-10-11 PROCEDURE — 93653 COMPRE EP EVAL TX SVT: CPT | Performed by: INTERNAL MEDICINE

## 2024-10-11 PROCEDURE — C1731 CATH, EP, 20 OR MORE ELEC: HCPCS | Performed by: INTERNAL MEDICINE

## 2024-10-11 PROCEDURE — 99152 MOD SED SAME PHYS/QHP 5/>YRS: CPT | Performed by: INTERNAL MEDICINE

## 2024-10-11 PROCEDURE — 99153 MOD SED SAME PHYS/QHP EA: CPT | Performed by: INTERNAL MEDICINE

## 2024-10-11 PROCEDURE — C1760 CLOSURE DEV, VASC: HCPCS | Performed by: INTERNAL MEDICINE

## 2024-10-11 PROCEDURE — C1894 INTRO/SHEATH, NON-LASER: HCPCS | Performed by: INTERNAL MEDICINE

## 2024-10-11 PROCEDURE — G0269 OCCLUSIVE DEVICE IN VEIN ART: HCPCS | Mod: 59 | Performed by: INTERNAL MEDICINE

## 2024-10-11 PROCEDURE — 93623 PRGRMD STIMJ&PACG IV RX NFS: CPT | Performed by: INTERNAL MEDICINE

## 2024-10-11 PROCEDURE — 2780000003 HC OR 278 NO HCPCS: Performed by: INTERNAL MEDICINE

## 2024-10-11 PROCEDURE — 2720000007 HC OR 272 NO HCPCS: Performed by: INTERNAL MEDICINE

## 2024-10-11 PROCEDURE — C1732 CATH, EP, DIAG/ABL, 3D/VECT: HCPCS | Performed by: INTERNAL MEDICINE

## 2024-10-11 PROCEDURE — 7100000009 HC PHASE TWO TIME - INITIAL BASE CHARGE: Performed by: INTERNAL MEDICINE

## 2024-10-11 RX ORDER — BUPIVACAINE HYDROCHLORIDE 5 MG/ML
INJECTION, SOLUTION EPIDURAL; INTRACAUDAL AS NEEDED
Status: DISCONTINUED | OUTPATIENT
Start: 2024-10-11 | End: 2024-10-11 | Stop reason: HOSPADM

## 2024-10-11 RX ORDER — MIDAZOLAM HYDROCHLORIDE 1 MG/ML
INJECTION, SOLUTION INTRAMUSCULAR; INTRAVENOUS AS NEEDED
Status: DISCONTINUED | OUTPATIENT
Start: 2024-10-11 | End: 2024-10-11 | Stop reason: HOSPADM

## 2024-10-11 RX ORDER — FENTANYL CITRATE 50 UG/ML
INJECTION, SOLUTION INTRAMUSCULAR; INTRAVENOUS AS NEEDED
Status: DISCONTINUED | OUTPATIENT
Start: 2024-10-11 | End: 2024-10-11 | Stop reason: HOSPADM

## 2024-10-11 ASSESSMENT — PAIN SCALES - GENERAL: PAINLEVEL_OUTOF10: 0 - NO PAIN

## 2024-10-11 ASSESSMENT — COLUMBIA-SUICIDE SEVERITY RATING SCALE - C-SSRS
2. HAVE YOU ACTUALLY HAD ANY THOUGHTS OF KILLING YOURSELF?: NO
1. IN THE PAST MONTH, HAVE YOU WISHED YOU WERE DEAD OR WISHED YOU COULD GO TO SLEEP AND NOT WAKE UP?: NO
6. HAVE YOU EVER DONE ANYTHING, STARTED TO DO ANYTHING, OR PREPARED TO DO ANYTHING TO END YOUR LIFE?: NO

## 2024-10-11 ASSESSMENT — PAIN - FUNCTIONAL ASSESSMENT: PAIN_FUNCTIONAL_ASSESSMENT: 0-10

## 2024-10-11 NOTE — DISCHARGE INSTRUCTIONS
INSTRUCTIONS AFTER ABLATION PROCEDURE:    * You will need to continue blood thinner (Eliquis every 12 hours) until instructed otherwise. It is important not to interrupt blood thinner for any reason (other than an emergency) during the first 30 days after ablation.     * In the first week after ablation you should take it easy. No heavy lifting or heavy exertion, no treadmill.  You can use the stairs if needed but go slowly and minimize the number of times up and down.    * Some minor bruising is common at each groin access site with minor soreness as if you had banged the area. Bruising may occasionally be seen to extend down the leg. This is normal as is an occasional small quarter sized bump in the area. If larger swelling or more significant pain occurs at the area, please contact the office or go the nearest Emergency Room.    * All medications will generally remain the same unless you are told otherwise.  Resume taking your home medications today as listed on the discharge instructions.    *Continue to follow up with your primary cardiologist, primary care physician, and any other specialists you normally see.    *No driving for 2 days post procedure (IF you were driving prior to procedure).    *Diet: Heart healthy    Call Provider If:  Breathing faster than normal.     Fever of 100.4 F (38 C) or higher.     Chills.     Any new concerning symptoms.     Passing out.     Patient Instructions, Next 24 hours:  DO NOT drive a car, operate machinery or power tools.  It is recommended that a responsible adult be with you for the first 24 hours.     DO NOT drink any alcoholic drinks or take any non-prescriptive medications that contain alcohol for the first 24 hours.     DO NOT make any important decisions for the first 24 hours.    Activity:  You are advised to go directly home from the hospital.     DO NOT lift anything heavier than 10 pounds for one week, this allows for proper healing of the groin.     No  excessive exercise or treadmill use for one week. You may walk and do stairs, slowly.     No sexual activities for 24 hours after you arrive home.    Wound Care:  If slight bleeding should occur at site, lie down and have someone apply firm pressure just above the puncture site for 5 minutes.  If it continues or is profuse, call 911. Always notify your doctor if bleeding occurs.     Keep site clean and dry. Let air dry or you may use a simple bandaid.     Gently cleanse the puncture site in your groin with soap and water only.     You may experience some tenderness, bruising or minimal inflammation.  If you have any concerns, you may contact the Cath Lab or if any of these symptoms become excessive, contact your cardiologist or go to the emergency room.     No tub baths, soaking, or swimming for one week.     May shower the next day after your procedure.    If you have any questions about the effects of the sedative drugs or groin care, please call the physician who performed your procedure.    FOLLOW UP:   1) Lisa Patton CNP ( Electrophysiology) 1 month after ablation as scheduled

## 2024-10-14 ENCOUNTER — TELEPHONE (OUTPATIENT)
Dept: CARDIOLOGY | Facility: CLINIC | Age: 74
End: 2024-10-14
Payer: MEDICARE

## 2024-10-16 ENCOUNTER — APPOINTMENT (OUTPATIENT)
Dept: PRIMARY CARE | Facility: CLINIC | Age: 74
End: 2024-10-16
Payer: MEDICARE

## 2024-10-16 VITALS
WEIGHT: 179.8 LBS | BODY MASS INDEX: 23.08 KG/M2 | DIASTOLIC BLOOD PRESSURE: 70 MMHG | SYSTOLIC BLOOD PRESSURE: 106 MMHG | HEART RATE: 70 BPM | HEIGHT: 74 IN

## 2024-10-16 DIAGNOSIS — Z00.00 MEDICARE ANNUAL WELLNESS VISIT, SUBSEQUENT: Primary | ICD-10-CM

## 2024-10-16 DIAGNOSIS — N20.0 BILATERAL RENAL STONES: ICD-10-CM

## 2024-10-16 DIAGNOSIS — E78.2 MIXED HYPERLIPIDEMIA: ICD-10-CM

## 2024-10-16 DIAGNOSIS — I48.3 TYPICAL ATRIAL FLUTTER (MULTI): ICD-10-CM

## 2024-10-16 DIAGNOSIS — R39.11 BENIGN PROSTATIC HYPERPLASIA WITH URINARY HESITANCY: ICD-10-CM

## 2024-10-16 DIAGNOSIS — N52.9 VASCULOGENIC ERECTILE DYSFUNCTION, UNSPECIFIED VASCULOGENIC ERECTILE DYSFUNCTION TYPE: ICD-10-CM

## 2024-10-16 DIAGNOSIS — R53.83 OTHER FATIGUE: ICD-10-CM

## 2024-10-16 DIAGNOSIS — N40.1 BENIGN PROSTATIC HYPERPLASIA WITH URINARY HESITANCY: ICD-10-CM

## 2024-10-16 DIAGNOSIS — M50.30 DEGENERATIVE CERVICAL DISC: ICD-10-CM

## 2024-10-16 DIAGNOSIS — E87.5 HYPERKALEMIA: ICD-10-CM

## 2024-10-16 DIAGNOSIS — M54.2 NECK PAIN: ICD-10-CM

## 2024-10-16 DIAGNOSIS — H61.23 BILATERAL IMPACTED CERUMEN: ICD-10-CM

## 2024-10-16 LAB
CHOLEST SERPL-MCNC: 180 MG/DL (ref 0–199)
CHOLESTEROL/HDL RATIO: 2.9
HDLC SERPL-MCNC: 61.5 MG/DL
LDLC SERPL CALC-MCNC: 100 MG/DL
NON HDL CHOLESTEROL: 119 MG/DL (ref 0–149)
TRIGL SERPL-MCNC: 93 MG/DL (ref 0–149)
VLDL: 19 MG/DL (ref 0–40)

## 2024-10-16 PROCEDURE — 69209 REMOVE IMPACTED EAR WAX UNI: CPT | Performed by: INTERNAL MEDICINE

## 2024-10-16 PROCEDURE — 1124F ACP DISCUSS-NO DSCNMKR DOCD: CPT | Performed by: INTERNAL MEDICINE

## 2024-10-16 PROCEDURE — G0439 PPPS, SUBSEQ VISIT: HCPCS | Performed by: INTERNAL MEDICINE

## 2024-10-16 PROCEDURE — 3008F BODY MASS INDEX DOCD: CPT | Performed by: INTERNAL MEDICINE

## 2024-10-16 PROCEDURE — G0008 ADMIN INFLUENZA VIRUS VAC: HCPCS | Performed by: INTERNAL MEDICINE

## 2024-10-16 PROCEDURE — 99213 OFFICE O/P EST LOW 20 MIN: CPT | Performed by: INTERNAL MEDICINE

## 2024-10-16 PROCEDURE — 99397 PER PM REEVAL EST PAT 65+ YR: CPT | Performed by: INTERNAL MEDICINE

## 2024-10-16 PROCEDURE — 1170F FXNL STATUS ASSESSED: CPT | Performed by: INTERNAL MEDICINE

## 2024-10-16 PROCEDURE — 90662 IIV NO PRSV INCREASED AG IM: CPT | Performed by: INTERNAL MEDICINE

## 2024-10-16 PROCEDURE — 1159F MED LIST DOCD IN RCRD: CPT | Performed by: INTERNAL MEDICINE

## 2024-10-16 RX ORDER — TADALAFIL 5 MG/1
5 TABLET ORAL DAILY
Qty: 90 TABLET | Refills: 3 | Status: SHIPPED | OUTPATIENT
Start: 2024-10-16

## 2024-10-16 ASSESSMENT — ACTIVITIES OF DAILY LIVING (ADL)
DRESSING: INDEPENDENT
TAKING_MEDICATION: INDEPENDENT
GROCERY_SHOPPING: INDEPENDENT
BATHING: INDEPENDENT
DOING_HOUSEWORK: INDEPENDENT
MANAGING_FINANCES: INDEPENDENT

## 2024-10-16 ASSESSMENT — ENCOUNTER SYMPTOMS
FATIGUE: 0
PHOTOPHOBIA: 0
STRIDOR: 0
ACTIVITY CHANGE: 0
DIZZINESS: 0
EYE REDNESS: 0
EYE DISCHARGE: 0
ENDOCRINE NEGATIVE: 1
HEADACHES: 0
APNEA: 0
ARTHRALGIAS: 0
NUMBNESS: 0
ALLERGIC/IMMUNOLOGIC NEGATIVE: 1
FEVER: 0
DIAPHORESIS: 0
EYE ITCHING: 0
LIGHT-HEADEDNESS: 0
COUGH: 0
APPETITE CHANGE: 0
GASTROINTESTINAL NEGATIVE: 1
WHEEZING: 0
CHILLS: 0
HEMATOLOGIC/LYMPHATIC NEGATIVE: 1
PALPITATIONS: 0
UNEXPECTED WEIGHT CHANGE: 0
EYE PAIN: 0
CHEST TIGHTNESS: 0
CHOKING: 0
SHORTNESS OF BREATH: 0
PSYCHIATRIC NEGATIVE: 1
FACIAL ASYMMETRY: 0

## 2024-10-16 ASSESSMENT — PATIENT HEALTH QUESTIONNAIRE - PHQ9
2. FEELING DOWN, DEPRESSED OR HOPELESS: NOT AT ALL
SUM OF ALL RESPONSES TO PHQ9 QUESTIONS 1 AND 2: 0
1. LITTLE INTEREST OR PLEASURE IN DOING THINGS: NOT AT ALL

## 2024-10-16 NOTE — PROGRESS NOTES
Subjective   Reason for Visit: Christophe Cedillo is an 73 y.o. male here for a Medicare Wellness visit.               [facility] ABLATION  DR HARVEY    SP [address]RO [address] DR RODRGIUEZ , CYSTO ETC     PAST MEDICAL HISTORY   Diagnosis Date   Dupuytren's disease   Hypercholesteremia   Left ankle pain   Neck pain   Raynaud's syndrome   Renal calculi 12/13/2022   Right hip pain   Right knee pain   Spinal stenosis     History reviewed. No pertinent family history.    SOCIAL HISTORY  Social History     Tobacco Use   Smoking status: Never   Smokeless tobacco: Never   Vaping Use   Vaping Use: Never used   Substance Use Topics   Alcohol use: No   Drug use: Never     MEDICATIONS:   tamsulosin (FLOMAX) 0.4 mg take 1 capsule by mouth once daily   acetaminophen (TYLENOL) 325 mg tablet Take 650 mg by mouth every 6 hours as needed.   Tadalafil 5 mg tablet Take 5 mg by mouth once daily.   atorvastatin (LIPITOR) 10 mg tablet TAKE 1 TABLET BY MOUTH EVERY DAY IN THE MORNING   Doxycycline Hyclate 100 mg EC tablet Take 100 mg by mouth twice daily.   apixaban (ELIQUIS ORAL) Take by mouth.   iv contrast (will be provided with radiology test) CT Urogram WO/W Inject, intravenously, once for 1 dose.No IV access, insert saline lock prior to the beginning of sedation, infusion, injection of imaging exam. Discontinue saline lock post exam. If Pt. has a central line or IVAD, may access for administration according to line specific nursing protocol. Once exam is complete flush line and de-access according to line specific nursing protocol in the CT contrast administration guidelines link.   0.9 % sodium chloride (NACL 0.9%) infusion Administer at rate defined per CT contrast administration specifications. To be provided with radiology test.     Patient Care Team:  Luther Archer MD as PCP - General (Internal Medicine)  Luther Archer MD as PCP - [employer] PCP  Art Anderson MD as Consulting Physician (Cardiology)     Review of Systems  "  Constitutional:  Negative for activity change, appetite change, chills, diaphoresis, fatigue, fever and unexpected weight change.   HENT: Negative.     Eyes:  Negative for photophobia, pain, discharge, redness, itching and visual disturbance.   Respiratory:  Negative for apnea, cough, choking, chest tightness, shortness of breath, wheezing and stridor.    Cardiovascular:  Negative for chest pain, palpitations and leg swelling.   Gastrointestinal: Negative.    Endocrine: Negative.    Genitourinary: Negative.    Musculoskeletal:  Negative for arthralgias.   Skin: Negative.    Allergic/Immunologic: Negative.    Neurological:  Negative for dizziness, facial asymmetry, light-headedness, numbness and headaches.   Hematological: Negative.    Psychiatric/Behavioral: Negative.         Objective   Vitals:  /70 (BP Location: Left arm, Patient Position: Sitting, BP Cuff Size: Adult)   Pulse 70   Ht 1.88 m (6' 2\")   Wt 81.6 kg (179 lb 12.8 oz)   BMI 23.08 kg/m²       Physical Exam  Constitutional:       Appearance: Normal appearance.   HENT:      Head: Normocephalic and atraumatic.      Right Ear: Tympanic membrane normal.      Left Ear: Tympanic membrane normal.      Nose: Nose normal.   Eyes:      Extraocular Movements: Extraocular movements intact.      Conjunctiva/sclera: Conjunctivae normal.      Pupils: Pupils are equal, round, and reactive to light.   Cardiovascular:      Rate and Rhythm: Normal rate and regular rhythm.      Pulses: Normal pulses.      Heart sounds: Normal heart sounds.   Pulmonary:      Effort: Pulmonary effort is normal.      Breath sounds: Normal breath sounds.   Abdominal:      General: Abdomen is flat. Bowel sounds are normal.      Palpations: Abdomen is soft.   Musculoskeletal:         General: Normal range of motion.      Cervical back: Normal range of motion and neck supple.   Skin:     General: Skin is warm and dry.   Neurological:      General: No focal deficit present.      Mental " Status: He is oriented to person, place, and time. Mental status is at baseline.   Psychiatric:         Mood and Affect: Mood normal.         Behavior: Behavior normal.         Thought Content: Thought content normal.         Judgment: Judgment normal.         Assessment & Plan  Medicare annual wellness visit, subsequent    Orders:    Lipid panel; Future    Hepatic Function Panel; Future    Typical atrial flutter (Multi)  GOOD SP ABLATION HOWEVER MAY NOT HAVE WORKED?  Orders:    Lipid panel; Future    Hepatic Function Panel; Future    Neck pain  SEE MRI   Orders:    Lipid panel; Future    Hepatic Function Panel; Future    Mixed hyperlipidemia  FOLLOW UP   Orders:    tadalafil (Cialis) 5 mg tablet; Take 1 tablet (5 mg) by mouth once daily.    Lipid panel; Future    Hepatic Function Panel; Future    Other fatigue  LETS DO LABS  Orders:    Lipid panel; Future    Hepatic Function Panel; Future    Hyperkalemia  LET DO LABS  Orders:    Lipid panel; Future    Hepatic Function Panel; Future    Bilateral renal stones  STABLE   Orders:    Lipid panel; Future    Hepatic Function Panel; Future    Degenerative cervical disc  SIGN HAS HAD MANY RX.  Orders:    Lipid panel; Future    Hepatic Function Panel; Future    Vasculogenic erectile dysfunction, unspecified vasculogenic erectile dysfunction type    Orders:    tadalafil (Cialis) 5 mg tablet; Take 1 tablet (5 mg) by mouth once daily.    Lipid panel; Future    Hepatic Function Panel; Future    Benign prostatic hyperplasia with urinary hesitancy    Orders:    tadalafil (Cialis) 5 mg tablet; Take 1 tablet (5 mg) by mouth once daily.    Lipid panel; Future    Hepatic Function Panel; Future    Bilateral impacted cerumen  CLEAN OUT

## 2024-10-16 NOTE — ASSESSMENT & PLAN NOTE
GOOD SP ABLATION HOWEVER MAY NOT HAVE WORKED?  Orders:    Lipid panel; Future    Hepatic Function Panel; Future

## 2024-10-16 NOTE — ASSESSMENT & PLAN NOTE
Orders:    tadalafil (Cialis) 5 mg tablet; Take 1 tablet (5 mg) by mouth once daily.    Lipid panel; Future    Hepatic Function Panel; Future

## 2024-10-16 NOTE — ASSESSMENT & PLAN NOTE
FOLLOW UP   Orders:    tadalafil (Cialis) 5 mg tablet; Take 1 tablet (5 mg) by mouth once daily.    Lipid panel; Future    Hepatic Function Panel; Future

## 2024-10-16 NOTE — PROGRESS NOTES
Ear Cerumen Removal    Date/Time: 10/16/2024 9:25 AM    Performed by: Ivette Reeder  Authorized by: Luther Archer MD    Consent:     Consent obtained:  Verbal    Consent given by:  Patient    Risks, benefits, and alternatives were discussed: yes      Risks discussed:  Pain, dizziness and incomplete removal    Alternatives discussed:  Alternative treatment  Universal protocol:     Patient identity confirmed:  Verbally with patient  Procedure details:     Location:  L ear and R ear    Procedure type: irrigation      Procedure outcomes: cerumen removed    Post-procedure details:     Inspection:  Ear canal clear and some cerumen remaining    Hearing quality:  Improved  Comments:      LEFT EAR SUCCESSFUL, RIGHT EAR STILL HAD A LITTLE REMAINING.

## 2024-10-18 DIAGNOSIS — J06.9 VIRAL UPPER RESPIRATORY TRACT INFECTION: ICD-10-CM

## 2024-10-18 DIAGNOSIS — R53.83 OTHER FATIGUE: ICD-10-CM

## 2024-10-18 DIAGNOSIS — R09.89 SYMPTOMS OF UPPER RESPIRATORY INFECTION (URI): ICD-10-CM

## 2024-10-18 DIAGNOSIS — R00.0 INCREASED HEART RATE: ICD-10-CM

## 2024-10-18 DIAGNOSIS — I48.92 ATRIAL FLUTTER, UNSPECIFIED TYPE (MULTI): ICD-10-CM

## 2024-11-02 DIAGNOSIS — N40.1 BENIGN PROSTATIC HYPERPLASIA WITH URINARY HESITANCY: ICD-10-CM

## 2024-11-02 DIAGNOSIS — R39.11 BENIGN PROSTATIC HYPERPLASIA WITH URINARY HESITANCY: ICD-10-CM

## 2024-11-02 DIAGNOSIS — N52.9 VASCULOGENIC ERECTILE DYSFUNCTION, UNSPECIFIED VASCULOGENIC ERECTILE DYSFUNCTION TYPE: ICD-10-CM

## 2024-11-02 DIAGNOSIS — E78.2 MIXED HYPERLIPIDEMIA: ICD-10-CM

## 2024-11-04 RX ORDER — ATORVASTATIN CALCIUM 10 MG/1
10 TABLET, FILM COATED ORAL
Qty: 90 TABLET | Refills: 1 | Status: SHIPPED | OUTPATIENT
Start: 2024-11-04

## 2024-11-15 ENCOUNTER — OFFICE VISIT (OUTPATIENT)
Dept: CARDIOLOGY | Facility: CLINIC | Age: 74
End: 2024-11-15
Payer: MEDICARE

## 2024-11-15 VITALS
BODY MASS INDEX: 22.63 KG/M2 | DIASTOLIC BLOOD PRESSURE: 76 MMHG | HEIGHT: 75 IN | RESPIRATION RATE: 18 BRPM | WEIGHT: 182 LBS | OXYGEN SATURATION: 99 % | HEART RATE: 51 BPM | SYSTOLIC BLOOD PRESSURE: 118 MMHG

## 2024-11-15 DIAGNOSIS — I48.3 TYPICAL ATRIAL FLUTTER (MULTI): Primary | ICD-10-CM

## 2024-11-15 PROCEDURE — 1036F TOBACCO NON-USER: CPT | Performed by: NURSE PRACTITIONER

## 2024-11-15 PROCEDURE — 93005 ELECTROCARDIOGRAM TRACING: CPT | Performed by: NURSE PRACTITIONER

## 2024-11-15 PROCEDURE — 3008F BODY MASS INDEX DOCD: CPT | Performed by: NURSE PRACTITIONER

## 2024-11-15 PROCEDURE — 99214 OFFICE O/P EST MOD 30 MIN: CPT | Performed by: NURSE PRACTITIONER

## 2024-11-15 PROCEDURE — 1160F RVW MEDS BY RX/DR IN RCRD: CPT | Performed by: NURSE PRACTITIONER

## 2024-11-15 PROCEDURE — 1123F ACP DISCUSS/DSCN MKR DOCD: CPT | Performed by: NURSE PRACTITIONER

## 2024-11-15 PROCEDURE — 1159F MED LIST DOCD IN RCRD: CPT | Performed by: NURSE PRACTITIONER

## 2024-11-15 ASSESSMENT — ENCOUNTER SYMPTOMS
PALPITATIONS: 0
DIAPHORESIS: 0
VOMITING: 0
SPUTUM PRODUCTION: 0
FALLS: 0
SORE THROAT: 0
HEMOPTYSIS: 0
DIZZINESS: 0
HEADACHES: 0
WEAKNESS: 0
DIARRHEA: 0
BLURRED VISION: 0
NAUSEA: 0
MYALGIAS: 0
DOUBLE VISION: 0
NEAR-SYNCOPE: 0
ABDOMINAL PAIN: 0
ORTHOPNEA: 0
FEVER: 0
DYSPNEA ON EXERTION: 0
COUGH: 0
SYNCOPE: 0
SHORTNESS OF BREATH: 0
PND: 0
IRREGULAR HEARTBEAT: 0
LIGHT-HEADEDNESS: 0
SNORING: 0

## 2024-11-15 ASSESSMENT — PATIENT HEALTH QUESTIONNAIRE - PHQ9
SUM OF ALL RESPONSES TO PHQ9 QUESTIONS 1 AND 2: 0
1. LITTLE INTEREST OR PLEASURE IN DOING THINGS: NOT AT ALL
2. FEELING DOWN, DEPRESSED OR HOPELESS: NOT AT ALL

## 2024-11-16 NOTE — PROGRESS NOTES
"Subjective   Christophe Cedillo is a 73 y.o. male.    Chief Complaint:  Atrial Flutter    73 year old male presents today for follow up post Atrial flutter ablation  PMH includes: HLD, sinus bradycardia and atrial flutter (10/2023)     The patient was diagnosed with paroxysmal typical atrial flutter in October 2023 and was initially scheduled for cardioversion but self-converted. During his cardiology visit on August 14, 2024, he reported experiencing atrial flutter again, accompanied by lightheadedness and dizziness. As a regular runner, he has noticed increased fatigue and a more elevated heart rate during exercise.    An EKG performed during the visit confirmed typical atrial flutter at 72 bpm, although his normal resting heart rate is in the 40s-50s. His [id number] score is 1, and he has been treated with Eliquis, which he has tolerated well without bleeding complications.      His longest episodes was 3-4 weeks. His primary symptom is dizziness.      TESTING  - EVENT MONITOR 11/08/23: AF (100%) AVG HR 68 with 4.8 s pause.     Now s/p CTI RFA with Dr. Lu 10/14/2024  ECG 11/15/2024 SB HR 51 bpm    TODAY patient is now 1 month post atrial flutter ablation.  He denies any recurrence of his arrhythmia symptoms.  He denies any complications from the procedure.  He denies any chest pains, shortness of breath, dizziness and syncope.    /76 (BP Location: Right arm)   Pulse 51   Resp 18   Ht 1.905 m (6' 3\")   Wt 82.6 kg (182 lb)   SpO2 99%   BMI 22.75 kg/m²   Current Outpatient Medications on File Prior to Visit   Medication Sig Dispense Refill    apixaban (Eliquis) 5 mg tablet Take 1 tablet (5 mg) by mouth 2 times a day. 180 tablet 3    atorvastatin (Lipitor) 10 mg tablet TAKE 1 TABLET BY MOUTH ONCE DAILY IN THE MORNING. TAKE BEFORE A MEAL. 90 tablet 1    tadalafil (Cialis) 5 mg tablet Take 1 tablet (5 mg) by mouth once daily. 90 tablet 3    tamsulosin (Flomax) 0.4 mg 24 hr capsule Take 1 capsule (0.4 mg) by " mouth once daily.       No current facility-administered medications on file prior to visit.         Review of Systems   Constitutional: Negative for diaphoresis, fever and malaise/fatigue.   HENT:  Negative for congestion and sore throat.    Eyes:  Negative for blurred vision and double vision.   Cardiovascular:  Negative for chest pain, dyspnea on exertion, irregular heartbeat, leg swelling, near-syncope, orthopnea, palpitations, paroxysmal nocturnal dyspnea and syncope.   Respiratory:  Negative for cough, hemoptysis, shortness of breath, snoring and sputum production.    Hematologic/Lymphatic: Negative for bleeding problem.   Skin:  Negative for rash.   Musculoskeletal:  Negative for falls, joint pain and myalgias.   Gastrointestinal:  Negative for abdominal pain, diarrhea, nausea and vomiting.   Neurological:  Negative for dizziness, headaches, light-headedness and weakness.   All other systems reviewed and are negative.      Objective   Constitutional:       Appearance: Healthy appearance. Not in distress.   Eyes:      Conjunctiva/sclera: Conjunctivae normal.   HENT:      Nose: Nose normal.    Mouth/Throat:      Pharynx: Oropharynx is clear.   Neck:      Vascular: No JVR. JVD normal.   Pulmonary:      Effort: Pulmonary effort is normal.      Breath sounds: Normal breath sounds. No wheezing. No rhonchi.   Chest:      Chest wall: Not tender to palpatation.   Cardiovascular:      Normal rate. Regular rhythm.      Murmurs: There is no murmur.      No rub.   Pulses:     Intact distal pulses.   Edema:     Peripheral edema absent.   Abdominal:      General: Bowel sounds are normal.      Palpations: Abdomen is soft.   Musculoskeletal: Normal range of motion.      Cervical back: Neck supple. Skin:     General: Skin is warm and dry.      Comments: Right groin site well approximated, no bruising/bleeding/swelling/redness/pain   Neurological:      Mental Status: Alert and oriented to person, place and time.      Motor:  Motor function is intact.     I personally reviewed his most recent Holter monitor, CBC, BMP, lipid panel.    Lab Review:   Lab Results   Component Value Date     10/10/2024    K 4.7 10/10/2024     10/10/2024    CO2 28 10/10/2024    BUN 19 10/10/2024    CREATININE 1.11 10/10/2024    GLUCOSE 73 (L) 10/10/2024    CALCIUM 10.0 10/10/2024     Lab Results   Component Value Date    WBC 3.8 (L) 10/10/2024    HGB 15.6 10/10/2024    HCT 46.2 10/10/2024    MCV 96 10/10/2024     10/10/2024       Assessment/Plan   The encounter diagnosis was Typical atrial flutter (Multi).  Patient is now 1 month post atrial flutter ablation and feeling good.  He is back to his normal activities distances.  He denies any recurrence of his arrhythmia symptoms or any complications from the procedure    We discussed normal symptoms that can happen post ablation and when he should go to the ED or have an office visit.  Patient expresses understanding, all questions asked and answered.    Continue Eliquis for 1 more month and then he can stop  Follow-up with general cardiology as scheduled  Follow-up with PA in the office as needed for recurrent arrhythmia symptoms

## 2024-11-18 LAB
ATRIAL RATE: 51 BPM
P AXIS: 69 DEGREES
P OFFSET: 185 MS
P ONSET: 122 MS
PR INTERVAL: 202 MS
Q ONSET: 223 MS
QRS COUNT: 9 BEATS
QRS DURATION: 90 MS
QT INTERVAL: 420 MS
QTC CALCULATION(BAZETT): 387 MS
QTC FREDERICIA: 398 MS
R AXIS: 71 DEGREES
T AXIS: 57 DEGREES
T OFFSET: 433 MS
VENTRICULAR RATE: 51 BPM

## 2024-11-20 ENCOUNTER — TELEPHONE (OUTPATIENT)
Dept: CARDIOLOGY | Facility: CLINIC | Age: 74
End: 2024-11-20
Payer: MEDICARE

## 2024-11-20 DIAGNOSIS — I48.92 ATRIAL FLUTTER, UNSPECIFIED TYPE (MULTI): ICD-10-CM

## 2024-11-20 DIAGNOSIS — R53.83 OTHER FATIGUE: ICD-10-CM

## 2024-11-20 DIAGNOSIS — I48.3 TYPICAL ATRIAL FLUTTER (MULTI): Primary | ICD-10-CM

## 2024-11-20 DIAGNOSIS — R09.89 SYMPTOMS OF UPPER RESPIRATORY INFECTION (URI): ICD-10-CM

## 2024-11-20 DIAGNOSIS — R00.0 INCREASED HEART RATE: ICD-10-CM

## 2024-11-20 DIAGNOSIS — J06.9 VIRAL UPPER RESPIRATORY TRACT INFECTION: ICD-10-CM

## 2024-11-20 RX ORDER — NIFEDIPINE 30 MG/1
30 TABLET, FILM COATED, EXTENDED RELEASE ORAL
Qty: 90 TABLET | Refills: 1 | Status: SHIPPED | OUTPATIENT
Start: 2024-11-20

## 2024-11-20 RX ORDER — NIFEDIPINE 30 MG/1
30 TABLET, FILM COATED, EXTENDED RELEASE ORAL
COMMUNITY
End: 2024-11-20 | Stop reason: SDUPTHER

## 2024-11-20 NOTE — TELEPHONE ENCOUNTER
PATIENT ASKING FOR THE SCRIPT FOR HIS RAYNAUDS SYNDROME  HE'S HAVING SOME COLDNESS DUE TO CIRCULATION DUE TO FEET

## 2024-12-05 ENCOUNTER — APPOINTMENT (OUTPATIENT)
Dept: CARDIOLOGY | Facility: CLINIC | Age: 74
End: 2024-12-05
Payer: MEDICARE

## 2024-12-05 VITALS
HEART RATE: 65 BPM | WEIGHT: 187.5 LBS | SYSTOLIC BLOOD PRESSURE: 130 MMHG | DIASTOLIC BLOOD PRESSURE: 76 MMHG | BODY MASS INDEX: 23.44 KG/M2

## 2024-12-05 DIAGNOSIS — I48.3 TYPICAL ATRIAL FLUTTER (MULTI): Primary | ICD-10-CM

## 2024-12-05 DIAGNOSIS — E78.2 MIXED HYPERLIPIDEMIA: ICD-10-CM

## 2024-12-05 PROCEDURE — 99214 OFFICE O/P EST MOD 30 MIN: CPT | Performed by: NURSE PRACTITIONER

## 2024-12-05 PROCEDURE — 1123F ACP DISCUSS/DSCN MKR DOCD: CPT | Performed by: NURSE PRACTITIONER

## 2024-12-05 PROCEDURE — 1159F MED LIST DOCD IN RCRD: CPT | Performed by: NURSE PRACTITIONER

## 2024-12-05 PROCEDURE — 1160F RVW MEDS BY RX/DR IN RCRD: CPT | Performed by: NURSE PRACTITIONER

## 2024-12-05 PROCEDURE — 1036F TOBACCO NON-USER: CPT | Performed by: NURSE PRACTITIONER

## 2024-12-05 NOTE — PROGRESS NOTES
Chief Complaint:   Atrial Flutter      History Of Present Illness:    Christophe Cedillo is a 74 y.o. male here with paroxysmal typical atrial flutter.     He called of our office, on 8/14/24, with concerns of being in atrial flutter. Reported feeling lightheaded, dizzy. He is a regular runner and reports that he had become fatigued quicker. EKG confirmed typical aflutter at 72bpm. Normal resting heart rate, for patient, is 40-50s. Plan was for cardioversion. He then called 8/25/24 and believed he was in NSR, EKG confirmed.  We discussed RFA, which he underwent with Dr. Lu on 10/11/24.    He followed up with Lisa Patton 11/15/24 were Eliquis was recommended for 1 more month.     He denies any further episodes of palpitations, tachycardia, lightheadedness, dizziness.    Patient presented to our office in aflutter in October 2023. Planned for a CVN on 11/10/23. When he presented for CVN he had self converted.        Allergies:  Patient has no known allergies.    Review of Systems  All pertinent systems have been reviewed and are negative except for what is stated in the history of present illness.    All other systems have been reviewed and are negative and noncontributory to this patient's current ailments.     Visit Vitals  /76 (BP Location: Right arm)   Pulse 65   Wt 85 kg (187 lb 8 oz)   BMI 23.44 kg/m²   Smoking Status Never   BSA 2.12 m²       Objective   Vitals reviewed.   Constitutional:       Appearance: Healthy appearance. Not in distress.   Neck:      Vascular: No JVR. JVD normal.   Pulmonary:      Effort: Pulmonary effort is normal.      Breath sounds: Normal breath sounds. No wheezing. No rhonchi. No rales.   Chest:      Chest wall: Not tender to palpatation.   Cardiovascular:      PMI at left midclavicular line. Normal rate. Regular rhythm. Normal S1. Normal S2.       Murmurs: There is no murmur.      No gallop.  No click. No rub.   Edema:     Peripheral edema absent.   Abdominal:      General:  Bowel sounds are normal.      Palpations: Abdomen is soft.      Tenderness: There is no abdominal tenderness.   Musculoskeletal: Normal range of motion.         General: No tenderness. Skin:     General: Skin is warm and dry.   Neurological:      General: No focal deficit present.      Mental Status: Alert and oriented to person, place and time.       Assessment/Plan   Diagnoses and all orders for this visit:  Typical atrial flutter (CMS/HCC)  - remains in NSR post RFA   - EP recommending stopping Eliquis on the 14th   Hyperlipidemia, unspecified hyperlipidemia type  - stable  - continue atorvastatin    Follow up in 6 months     Outpatient Medications:  Current Outpatient Medications   Medication Instructions    apixaban (ELIQUIS) 5 mg, oral, 2 times daily    atorvastatin (LIPITOR) 10 mg, oral, Daily before breakfast    NIFEdipine ER (ADALAT CC) 30 mg, oral, Daily before breakfast, Do not crush, chew, or split.    tadalafil (CIALIS) 5 mg, oral, Daily    tamsulosin (FLOMAX) 0.4 mg, Daily       Exclusive of any other services or procedures performed, IKya, spent 36 minutes in duration for this visit today.  This time consisted of chart review, obtaining history, and/or performing the exam as documented above, as well as, documenting the clinical information for the encounter in the electronic record, discussing treatment options, plans, and/or goals with patient, family, and/or caregiver, refilling medications, updating the electronic record, ordering medicines, lab work, imaging, referrals, and/or procedures as documented above and communicating with other [facility] professionals. I have discussed the results of laboratory, radiology, and cardiology studies with the patient and their family/caregiver.       I reviewed EP notes, procedure notes, PCP notes and labs

## 2025-01-02 ENCOUNTER — HOSPITAL ENCOUNTER (OUTPATIENT)
Dept: RADIOLOGY | Facility: CLINIC | Age: 75
Discharge: HOME | End: 2025-01-02
Payer: MEDICARE

## 2025-01-02 ENCOUNTER — OFFICE VISIT (OUTPATIENT)
Dept: ORTHOPEDIC SURGERY | Facility: CLINIC | Age: 75
End: 2025-01-02
Payer: MEDICARE

## 2025-01-02 VITALS — HEIGHT: 75 IN | BODY MASS INDEX: 23.25 KG/M2 | WEIGHT: 187 LBS

## 2025-01-02 DIAGNOSIS — M10.9 ACUTE GOUT OF LEFT ANKLE, UNSPECIFIED CAUSE: Primary | ICD-10-CM

## 2025-01-02 DIAGNOSIS — M25.572 ACUTE LEFT ANKLE PAIN: ICD-10-CM

## 2025-01-02 PROCEDURE — 99214 OFFICE O/P EST MOD 30 MIN: CPT | Performed by: SPECIALIST

## 2025-01-02 PROCEDURE — 1159F MED LIST DOCD IN RCRD: CPT | Performed by: SPECIALIST

## 2025-01-02 PROCEDURE — 73610 X-RAY EXAM OF ANKLE: CPT | Mod: LEFT SIDE | Performed by: STUDENT IN AN ORGANIZED HEALTH CARE EDUCATION/TRAINING PROGRAM

## 2025-01-02 PROCEDURE — 1123F ACP DISCUSS/DSCN MKR DOCD: CPT | Performed by: SPECIALIST

## 2025-01-02 PROCEDURE — 73610 X-RAY EXAM OF ANKLE: CPT | Mod: LT

## 2025-01-02 PROCEDURE — 1125F AMNT PAIN NOTED PAIN PRSNT: CPT | Performed by: SPECIALIST

## 2025-01-02 ASSESSMENT — PAIN SCALES - GENERAL: PAINLEVEL_OUTOF10: 2

## 2025-01-02 ASSESSMENT — PAIN - FUNCTIONAL ASSESSMENT: PAIN_FUNCTIONAL_ASSESSMENT: 0-10

## 2025-01-02 NOTE — PROGRESS NOTES
Established patient, left ankle last aspirated and injected 11/16/23. Injection lasts awhile. Patient has managed pain, otc ibuprofen. Patient wore his walking boot last 2days. Patient here to discuss long term options. Patient is on eliquis.  Has a known history of gout and it has been managed by primary care.  States his current pain actually is improved quite a bit since he made the appointment.    Exam: Left ankle has mild diffuse swelling and erythema.  Mild swelling in bilateral foot and ankle regions.  He has normal pedal pulses normal sensation motor intact.  Mild left greater than right anterior joint line pain of the ankle but full stable range of motion.  No other acute abnormalities.    Radiographs: 3 view standing left ankle shows no acute bone or joint abnormality.  Just mild degenerative changes.    Assessment plan: Left ankle synovitis possible gouty arthritis resolving.  As of today patient is encouraged to continue with conservative treatment I do not see any surgical issues.  Follow-up for repeat aspiration injection if he has a new flare.

## 2025-02-07 DIAGNOSIS — R39.11 BENIGN PROSTATIC HYPERPLASIA WITH URINARY HESITANCY: ICD-10-CM

## 2025-02-07 DIAGNOSIS — N40.1 BENIGN PROSTATIC HYPERPLASIA WITH URINARY HESITANCY: ICD-10-CM

## 2025-02-07 DIAGNOSIS — N52.9 VASCULOGENIC ERECTILE DYSFUNCTION, UNSPECIFIED VASCULOGENIC ERECTILE DYSFUNCTION TYPE: ICD-10-CM

## 2025-02-07 DIAGNOSIS — E78.2 MIXED HYPERLIPIDEMIA: ICD-10-CM

## 2025-02-07 RX ORDER — ATORVASTATIN CALCIUM 10 MG/1
10 TABLET, FILM COATED ORAL
Qty: 90 TABLET | Refills: 1 | Status: SHIPPED | OUTPATIENT
Start: 2025-02-07

## 2025-04-09 ENCOUNTER — TELEPHONE (OUTPATIENT)
Dept: CARDIOLOGY | Facility: CLINIC | Age: 75
End: 2025-04-09
Payer: MEDICARE

## 2025-04-09 ENCOUNTER — HOSPITAL ENCOUNTER (OUTPATIENT)
Dept: CARDIOLOGY | Facility: CLINIC | Age: 75
Discharge: HOME | End: 2025-04-09
Payer: MEDICARE

## 2025-04-09 DIAGNOSIS — I48.3 TYPICAL ATRIAL FLUTTER (MULTI): ICD-10-CM

## 2025-04-09 DIAGNOSIS — I48.3 TYPICAL ATRIAL FLUTTER (MULTI): Primary | ICD-10-CM

## 2025-04-09 PROCEDURE — 93246 EXT ECG>7D<15D RECORDING: CPT

## 2025-04-23 ENCOUNTER — TELEPHONE (OUTPATIENT)
Dept: CARDIOLOGY | Facility: CLINIC | Age: 75
End: 2025-04-23

## 2025-05-16 DIAGNOSIS — I48.3 TYPICAL ATRIAL FLUTTER (MULTI): ICD-10-CM

## 2025-05-16 RX ORDER — NIFEDIPINE 30 MG/1
TABLET, EXTENDED RELEASE ORAL
Qty: 90 TABLET | Refills: 1 | Status: SHIPPED | OUTPATIENT
Start: 2025-05-16

## 2025-06-05 ENCOUNTER — OFFICE VISIT (OUTPATIENT)
Dept: CARDIOLOGY | Facility: CLINIC | Age: 75
End: 2025-06-05
Payer: MEDICARE

## 2025-06-05 VITALS
WEIGHT: 179 LBS | DIASTOLIC BLOOD PRESSURE: 61 MMHG | SYSTOLIC BLOOD PRESSURE: 106 MMHG | HEART RATE: 64 BPM | BODY MASS INDEX: 22.37 KG/M2

## 2025-06-05 DIAGNOSIS — E78.2 MIXED HYPERLIPIDEMIA: ICD-10-CM

## 2025-06-05 DIAGNOSIS — R00.2 PALPITATIONS: ICD-10-CM

## 2025-06-05 DIAGNOSIS — I48.3 TYPICAL ATRIAL FLUTTER (MULTI): Primary | ICD-10-CM

## 2025-06-05 LAB
ATRIAL RATE: 54 BPM
P AXIS: 85 DEGREES
P OFFSET: 167 MS
P ONSET: 124 MS
PR INTERVAL: 200 MS
Q ONSET: 224 MS
QRS COUNT: 9 BEATS
QRS DURATION: 90 MS
QT INTERVAL: 408 MS
QTC CALCULATION(BAZETT): 386 MS
QTC FREDERICIA: 393 MS
R AXIS: 56 DEGREES
T AXIS: 28 DEGREES
T OFFSET: 428 MS
VENTRICULAR RATE: 54 BPM

## 2025-06-05 PROCEDURE — 93005 ELECTROCARDIOGRAM TRACING: CPT | Performed by: NURSE PRACTITIONER

## 2025-06-05 PROCEDURE — 1160F RVW MEDS BY RX/DR IN RCRD: CPT | Performed by: NURSE PRACTITIONER

## 2025-06-05 PROCEDURE — 99212 OFFICE O/P EST SF 10 MIN: CPT | Performed by: NURSE PRACTITIONER

## 2025-06-05 PROCEDURE — 1159F MED LIST DOCD IN RCRD: CPT | Performed by: NURSE PRACTITIONER

## 2025-06-05 PROCEDURE — 1036F TOBACCO NON-USER: CPT | Performed by: NURSE PRACTITIONER

## 2025-06-05 PROCEDURE — 99214 OFFICE O/P EST MOD 30 MIN: CPT | Performed by: NURSE PRACTITIONER

## 2025-06-05 NOTE — PROGRESS NOTES
Chief Complaint:   Atrial Flutter      History Of Present Illness:    Christophe Cedillo is a 74 y.o. male here with for routine follow-up of paroxysmal typical atrial flutter.  The patient initially presented with symptoms of palpitations.  He underwent RFA, with Dr. Lu, 10/11/2024. The patient has been maintaining sinus rhythm on medical treatment which they are tolerating well and are compliant. Normal resting heart rate, for patient, is 40-50s. The current treatment strategy is rhythm control.  The CHADS2-VASC2 score is 2 and the ACC/AHA guidelines recommend anticoagulation for stroke prophylaxis.  Since the patient has been maintaining NSR post RFA, and is symptomatic, EP discontinued his Eliquis.     Mr. Seth called our office on 4/9/25 with palpitations. Reported tachycardic episode. Holter placed and showed SB with brief, frequent PSVT. Up to 2.5 second pauses and rare PAC. Events correlated with NSR.     Denies palpitations since holter.    Still continues to run for exercise without complaint.     Patient presented to our office in [facility] in October 2023. Planned for a CVN on 11/10/23. When he presented for CVN he had self converted.        Allergies:  Patient has no known allergies.    Review of Systems  All pertinent systems have been reviewed and are negative except for what is stated in the history of present illness.    All other systems have been reviewed and are negative and noncontributory to this patient's current ailments.     Visit Vitals  /61 (BP Location: Right arm, Patient Position: Sitting, BP Cuff Size: Adult)   Pulse 64   Wt 81.2 kg (179 lb)   BMI 22.37 kg/m²   Smoking Status Never   BSA 2.07 m²     Last Labs:  CBC -  Lab Results   Component Value Date    WBC 3.8 (L) 10/10/2024    HGB 15.6 10/10/2024    HCT 46.2 10/10/2024    MCV 96 10/10/2024     10/10/2024       CMP -  Lab Results   Component Value Date    CALCIUM 10.0 10/10/2024    PROT 7.3 10/13/2023    ALBUMIN 4.3  "10/13/2023    AST 19 10/13/2023    ALT 22 10/13/2023    ALKPHOS 67 10/13/2023    BILITOT 1.9 (H) 10/13/2023    BUN 19 10/10/2024    CREATININE 1.11 10/10/2024       LIPID PANEL -   Lab Results   Component Value Date    CHOL 180 10/10/2024    TRIG 93 10/10/2024    HDL 61.5 10/10/2024    CHHDL 2.9 10/10/2024    LDLF 100 (H) 04/28/2023    VLDL 19 10/10/2024    NHDL 119 10/10/2024       RENAL FUNCTION PANEL -   Lab Results   Component Value Date    GLUCOSE 73 (L) 10/10/2024     10/10/2024    K 4.7 10/10/2024     10/10/2024    CO2 28 10/10/2024    ANIONGAP 15 10/10/2024    BUN 19 10/10/2024    CREATININE 1.11 10/10/2024    GFRMALE 71 04/28/2023    CALCIUM 10.0 10/10/2024    ALBUMIN 4.3 10/13/2023        No results found for: \"BNP\", \"HGBA1C\"    Objective   Vitals reviewed.   Constitutional:       Appearance: Healthy appearance. Not in distress.   Neck:      Vascular: No JVR. JVD normal.   Pulmonary:      Effort: Pulmonary effort is normal.      Breath sounds: Normal breath sounds. No wheezing. No rhonchi. No rales.   Chest:      Chest wall: Not tender to palpatation.   Cardiovascular:      PMI at left midclavicular line. Normal rate. Regular rhythm. Normal S1. Normal S2.       Murmurs: There is no murmur.      No gallop.  No click. No rub.   Edema:     Peripheral edema absent.   Abdominal:      General: Bowel sounds are normal.      Palpations: Abdomen is soft.      Tenderness: There is no abdominal tenderness.   Musculoskeletal: Normal range of motion.         General: No tenderness. Skin:     General: Skin is warm and dry.   Neurological:      General: No focal deficit present.      Mental Status: Alert and oriented to person, place and time.   Psychiatric:         Attention and Perception: Attention normal.         Mood and Affect: Mood normal.       Assessment/Plan   Diagnoses and all orders for this visit:  Typical atrial flutter (CMS/HCC)  - remains in NSR post RFA   - EP discontinued eliquis  - EKG " today Sb at 54bpm per my interpretation   Palpitations   - resolved  - Paroxysmal SVT on holter   Hyperlipidemia, unspecified hyperlipidemia type  - stable  - continue atorvastatin    Follow up in 1 year     Outpatient Medications:  Current Outpatient Medications   Medication Instructions    atorvastatin (LIPITOR) 10 mg, oral, Daily before breakfast    NIFEdipine CC 30 mg 24 hr tablet TAKE 1 TABLET (30 MG) BY MOUTH ONCE DAILY IN THE MORNING. TAKE BEFORE MEALS. DO NOT CRUSH, CHEW, OR SPLIT.    tadalafil (CIALIS) 5 mg, oral, Daily       Exclusive of any other services or procedures performed, I, Kya GARY, spent 30 minutes in duration for this visit today.  This time consisted of chart review, obtaining history, and/or performing the exam as documented above, as well as, documenting clinical information for the encounter in the electronic record. In addition to the history, testing, notes, and labs I have noted above; I have reviewed orthos note (1/25). PCP note (10/24). I reviewed and interpreted holter from 4/2025. I reviewed and interpreted EKG from today's visit.

## 2025-09-09 ENCOUNTER — APPOINTMENT (OUTPATIENT)
Dept: PRIMARY CARE | Facility: CLINIC | Age: 75
End: 2025-09-09
Payer: MEDICARE

## 2025-10-17 ENCOUNTER — APPOINTMENT (OUTPATIENT)
Dept: PRIMARY CARE | Facility: CLINIC | Age: 75
End: 2025-10-17
Payer: MEDICARE

## (undated) DEVICE — CABLE, 34 HYP, 34 LEMO, 10FT, SMART TOUCH

## (undated) DEVICE — CATHETER, THERMOCOOL SMART TOUCH, SF, D-F CURVE

## (undated) DEVICE — ACCESS KIT, S-MAK MINI, 4FR 10CM 0.018IN 40CM, NT/PT, ECHO ENHANCE NEEDLE

## (undated) DEVICE — CONNECTOR, CATHETER, RESPONSE, RED HEX

## (undated) DEVICE — INTRODUCER, HEMOSTASIS, STR/J .038 IN, 8.5FR 12CM

## (undated) DEVICE — TUBING SET, IRRIGATION, SMARTABLATE

## (undated) DEVICE — PATCHES, EXTERNAL REFERENCE, CARTO3

## (undated) DEVICE — PAD, ELECTRODE DEFIB PADPRO ADULT STRL W/ADAPTER

## (undated) DEVICE — CLOSURE SYSTEM, VASCULAR, MVP 6-12FR, VENOUS

## (undated) DEVICE — INTRODUCER, SHEATH, FAST-CATH, 8FR X 12CM, C-LOCK

## (undated) DEVICE — CATHETER, EPL, 7FR DUO, 2/8 2M 95CM, STEERABLE LGCRL